# Patient Record
Sex: FEMALE | Race: WHITE | Employment: UNEMPLOYED | URBAN - METROPOLITAN AREA
[De-identification: names, ages, dates, MRNs, and addresses within clinical notes are randomized per-mention and may not be internally consistent; named-entity substitution may affect disease eponyms.]

---

## 2020-01-21 ENCOUNTER — HOSPITAL ENCOUNTER (OUTPATIENT)
Age: 25
Discharge: HOME | End: 2020-01-21
Attending: OBSTETRICS & GYNECOLOGY | Admitting: OBSTETRICS & GYNECOLOGY
Payer: COMMERCIAL

## 2020-01-21 VITALS
SYSTOLIC BLOOD PRESSURE: 122 MMHG | TEMPERATURE: 97.9 F | RESPIRATION RATE: 16 BRPM | HEART RATE: 79 BPM | DIASTOLIC BLOOD PRESSURE: 75 MMHG

## 2020-01-21 PROBLEM — Z3A.28 28 WEEKS GESTATION OF PREGNANCY: Status: ACTIVE | Noted: 2020-01-21

## 2020-01-21 PROCEDURE — 99201 HC NEW PT, OUTPT VISIT LEVEL 1: CPT

## 2020-01-21 PROCEDURE — 59025 FETAL NON-STRESS TEST: CPT | Performed by: OBSTETRICS & GYNECOLOGY

## 2020-01-21 PROCEDURE — 59025 FETAL NON-STRESS TEST: CPT

## 2020-01-21 RX ORDER — ACETAMINOPHEN 325 MG/1
500 TABLET ORAL EVERY 6 HOURS PRN
COMMUNITY

## 2020-01-21 SDOH — HEALTH STABILITY: MENTAL HEALTH: HOW OFTEN DO YOU HAVE A DRINK CONTAINING ALCOHOL?: NEVER

## 2020-02-14 ENCOUNTER — HOSPITAL ENCOUNTER (OUTPATIENT)
Age: 25
Discharge: HOME | End: 2020-02-14
Attending: OBSTETRICS & GYNECOLOGY | Admitting: OBSTETRICS & GYNECOLOGY
Payer: COMMERCIAL

## 2020-02-14 VITALS
TEMPERATURE: 97.9 F | RESPIRATION RATE: 16 BRPM | SYSTOLIC BLOOD PRESSURE: 126 MMHG | HEART RATE: 78 BPM | DIASTOLIC BLOOD PRESSURE: 74 MMHG

## 2020-02-14 PROBLEM — O36.8190 DECREASED FETAL MOVEMENT AFFECTING MANAGEMENT OF MOTHER, ANTEPARTUM: Status: ACTIVE | Noted: 2020-02-14

## 2020-02-14 PROBLEM — Z3A.28 28 WEEKS GESTATION OF PREGNANCY: Status: RESOLVED | Noted: 2020-01-21 | Resolved: 2020-02-14

## 2020-02-14 LAB
BILIRUBIN URINE: NEGATIVE
BLOOD, URINE: NEGATIVE
CLARITY: CLEAR
COLOR: YELLOW
GLUCOSE URINE: NEGATIVE MG/DL
KETONES, URINE: NEGATIVE MG/DL
LEUKOCYTE ESTERASE, URINE: NEGATIVE
NITRITE, URINE: NEGATIVE
PH UA: 7.5 (ref 5–9)
PROTEIN UA: NEGATIVE MG/DL
SPECIFIC GRAVITY UA: 1.02 (ref 1–1.03)
UROBILINOGEN, URINE: 0.2 E.U./DL

## 2020-02-14 PROCEDURE — 81003 URINALYSIS AUTO W/O SCOPE: CPT

## 2020-02-14 PROCEDURE — 99211 OFF/OP EST MAY X REQ PHY/QHP: CPT

## 2020-02-14 RX ORDER — SODIUM CHLORIDE 0.9 % (FLUSH) 0.9 %
10 SYRINGE (ML) INJECTION PRN
Status: DISCONTINUED | OUTPATIENT
Start: 2020-02-14 | End: 2020-02-14 | Stop reason: HOSPADM

## 2020-02-14 RX ORDER — SODIUM CHLORIDE 0.9 % (FLUSH) 0.9 %
10 SYRINGE (ML) INJECTION EVERY 12 HOURS SCHEDULED
Status: DISCONTINUED | OUTPATIENT
Start: 2020-02-14 | End: 2020-02-14 | Stop reason: HOSPADM

## 2020-03-07 ENCOUNTER — HOSPITAL ENCOUNTER (OUTPATIENT)
Age: 25
Discharge: HOME | End: 2020-03-07
Attending: OBSTETRICS & GYNECOLOGY | Admitting: OBSTETRICS & GYNECOLOGY
Payer: COMMERCIAL

## 2020-03-07 VITALS
RESPIRATION RATE: 16 BRPM | SYSTOLIC BLOOD PRESSURE: 139 MMHG | TEMPERATURE: 98.5 F | DIASTOLIC BLOOD PRESSURE: 92 MMHG | HEART RATE: 78 BPM

## 2020-03-07 PROBLEM — Z3A.35 35 WEEKS GESTATION OF PREGNANCY: Status: ACTIVE | Noted: 2020-03-07

## 2020-03-07 LAB
AMNISURE, POC: NEGATIVE
BILIRUBIN URINE: NEGATIVE
BLOOD, URINE: NEGATIVE
CLARITY: CLEAR
COLOR: YELLOW
GLUCOSE URINE: NEGATIVE MG/DL
KETONES, URINE: NEGATIVE MG/DL
LEUKOCYTE ESTERASE, URINE: NEGATIVE
Lab: NORMAL
NEGATIVE QC PASS/FAIL: NORMAL
NITRITE, URINE: NEGATIVE
PH UA: 5.5 (ref 5–9)
POSITIVE QC PASS/FAIL: NORMAL
PROTEIN UA: NEGATIVE MG/DL
SPECIFIC GRAVITY UA: >=1.03 (ref 1–1.03)
UROBILINOGEN, URINE: 0.2 E.U./DL

## 2020-03-07 PROCEDURE — 99211 OFF/OP EST MAY X REQ PHY/QHP: CPT

## 2020-03-07 PROCEDURE — 81003 URINALYSIS AUTO W/O SCOPE: CPT

## 2020-03-07 NOTE — PROGRESS NOTES
presents to unit with c/o ROM and back contractions. 35w5d gestation. EDC: 20. Pt denies vaginal bleeding. Reports positive fetal movment. Pt states when using the bathroom 2 days ago she felt a gush of water. Since then, she reports fluid in her underwear or felt throughout the day. Also reports burning when urinating. Pt states contractions are felt in her back as well as period like cramps. Placed on EFM. amnisure negative.

## 2020-03-07 NOTE — H&P
Subjective:      Celi Florentino is an 22 y.o. female at 28 and 5/7 weeks gestation presenting with   Chief Complaint   Patient presents with    Rupture of Membranes   Patient reports having sexual activity last night. She denies patti rupture of membranes but she has been feeling wet. She is also complaining of contractions every a few minutes lasting few seconds. Other associated symptoms include low back pain and dysuria. Fetal Movement: normal.  Past Medical History:   Diagnosis Date    Streptococcus B carrier state complicating childbirth        Review of Systems  Pertinent items are noted in HPI. Objective:      BP (!) 138/99   Pulse 89   Temp 98.5 °F (36.9 °C) (Oral)   Resp 16   LMP 07/01/2019   Blood pressure (!) 138/99, pulse 89, temperature 98.5 °F (36.9 °C), temperature source Oral, resp. rate 16, last menstrual period 07/01/2019. General:   alert, appears stated age and cooperative   Cervix:   closed. FHT:   135 BPM     Lab Review    CBC: No results found for: WBC, RBC, HGB, HCT, MCV, MCH, MCHC, RDW, PLT, MPV  CMP:  No results found for: NA, K, CL, CO2, BUN, CREATININE, GFRAA, AGRATIO, LABGLOM, GLUCOSE, PROT, LABALBU, CALCIUM, BILITOT, ALKPHOS, AST, ALT  U/A:    Lab Results   Component Value Date    COLORU Yellow 02/14/2020    PHUR 7.5 02/14/2020    CLARITYU Clear 02/14/2020    SPECGRAV 1.020 02/14/2020    LEUKOCYTESUR Negative 02/14/2020    UROBILINOGEN 0.2 02/14/2020    BILIRUBINUR Negative 02/14/2020    BLOODU Negative 02/14/2020    GLUCOSEU Negative 02/14/2020     AmniSure was negative     Assessment:      Threatened premature Labor    Rupture of membranes, unconfirmed     Plan:      Monitored for contractions - findings: uterine irritability and reactive strip. Orders: Urinalysis.      Tammie Oviedo MD,3/7/2020 12:17 PM

## 2020-03-10 ENCOUNTER — HOSPITAL ENCOUNTER (OUTPATIENT)
Age: 25
Discharge: HOME | End: 2020-03-10
Attending: OBSTETRICS & GYNECOLOGY | Admitting: OBSTETRICS & GYNECOLOGY
Payer: COMMERCIAL

## 2020-03-10 VITALS
SYSTOLIC BLOOD PRESSURE: 122 MMHG | TEMPERATURE: 97.6 F | DIASTOLIC BLOOD PRESSURE: 79 MMHG | RESPIRATION RATE: 16 BRPM | HEART RATE: 84 BPM

## 2020-03-10 PROBLEM — Z3A.36 36 WEEKS GESTATION OF PREGNANCY: Status: ACTIVE | Noted: 2020-03-10

## 2020-03-10 PROBLEM — O16.3 ELEVATED BLOOD PRESSURE AFFECTING PREGNANCY IN THIRD TRIMESTER, ANTEPARTUM: Status: ACTIVE | Noted: 2020-03-10

## 2020-03-10 PROBLEM — B95.1 POSITIVE GBS TEST: Status: ACTIVE | Noted: 2020-03-10

## 2020-03-10 LAB
ABO/RH: NORMAL
ALBUMIN SERPL-MCNC: 3.5 G/DL (ref 3.5–5.2)
ALP BLD-CCNC: 87 U/L (ref 35–104)
ALT SERPL-CCNC: 13 U/L (ref 0–32)
AMNISURE, POC: NEGATIVE
AMORPHOUS: ABNORMAL
AMPHETAMINE SCREEN, URINE: POSITIVE
ANION GAP SERPL CALCULATED.3IONS-SCNC: 13 MMOL/L (ref 7–16)
ANTIBODY SCREEN: NORMAL
APTT: 25.6 SEC (ref 24.5–35.1)
AST SERPL-CCNC: 19 U/L (ref 0–31)
BACTERIA: ABNORMAL /HPF
BARBITURATE SCREEN URINE: NOT DETECTED
BENZODIAZEPINE SCREEN, URINE: NOT DETECTED
BILIRUB SERPL-MCNC: 0.2 MG/DL (ref 0–1.2)
BILIRUBIN URINE: NEGATIVE
BLOOD, URINE: NEGATIVE
BUN BLDV-MCNC: 10 MG/DL (ref 6–20)
CALCIUM SERPL-MCNC: 9.2 MG/DL (ref 8.6–10.2)
CANNABINOID SCREEN URINE: NOT DETECTED
CASTS: ABNORMAL /LPF
CHLORIDE BLD-SCNC: 105 MMOL/L (ref 98–107)
CLARITY: CLEAR
CO2: 20 MMOL/L (ref 22–29)
COCAINE METABOLITE SCREEN URINE: NOT DETECTED
COLOR: YELLOW
CREAT SERPL-MCNC: 0.7 MG/DL (ref 0.5–1)
CRYSTALS, UA: ABNORMAL /HPF
D DIMER: 522 NG/ML DDU
EPITHELIAL CELLS, UA: ABNORMAL /HPF
FENTANYL SCREEN, URINE: NOT DETECTED
FIBRINOGEN: 599 MG/DL (ref 225–540)
GFR AFRICAN AMERICAN: >60
GFR NON-AFRICAN AMERICAN: >60 ML/MIN/1.73
GLUCOSE BLD-MCNC: 80 MG/DL (ref 74–99)
GLUCOSE URINE: NEGATIVE MG/DL
HCT VFR BLD CALC: 32.1 % (ref 34–48)
HEMOGLOBIN: 11.1 G/DL (ref 11.5–15.5)
INR BLD: 0.8
KETONES, URINE: NEGATIVE MG/DL
LEUKOCYTE ESTERASE, URINE: NEGATIVE
Lab: ABNORMAL
Lab: NORMAL
MCH RBC QN AUTO: 32.8 PG (ref 26–35)
MCHC RBC AUTO-ENTMCNC: 34.6 % (ref 32–34.5)
MCV RBC AUTO: 95 FL (ref 80–99.9)
METHADONE SCREEN, URINE: NOT DETECTED
NEGATIVE QC PASS/FAIL: NORMAL
NITRITE, URINE: NEGATIVE
OPIATE SCREEN URINE: NOT DETECTED
OXYCODONE URINE: NOT DETECTED
PDW BLD-RTO: 12.5 FL (ref 11.5–15)
PH UA: 6 (ref 5–9)
PHENCYCLIDINE SCREEN URINE: NOT DETECTED
PLATELET # BLD: 142 E9/L (ref 130–450)
PMV BLD AUTO: 11.3 FL (ref 7–12)
POSITIVE QC PASS/FAIL: NORMAL
POTASSIUM SERPL-SCNC: 3.9 MMOL/L (ref 3.5–5)
PROTEIN UA: NORMAL MG/DL
PROTHROMBIN TIME: 10 SEC (ref 9.3–12.4)
RBC # BLD: 3.38 E12/L (ref 3.5–5.5)
RBC UA: ABNORMAL /HPF (ref 0–2)
SODIUM BLD-SCNC: 138 MMOL/L (ref 132–146)
SPECIFIC GRAVITY UA: >=1.03 (ref 1–1.03)
TOTAL PROTEIN: 6.5 G/DL (ref 6.4–8.3)
URIC ACID, SERUM: 4.4 MG/DL (ref 2.4–5.7)
UROBILINOGEN, URINE: 0.2 E.U./DL
WBC # BLD: 10.2 E9/L (ref 4.5–11.5)
WBC UA: ABNORMAL /HPF (ref 0–5)

## 2020-03-10 PROCEDURE — 36415 COLL VENOUS BLD VENIPUNCTURE: CPT

## 2020-03-10 PROCEDURE — 80307 DRUG TEST PRSMV CHEM ANLYZR: CPT

## 2020-03-10 PROCEDURE — G0480 DRUG TEST DEF 1-7 CLASSES: HCPCS

## 2020-03-10 PROCEDURE — 86901 BLOOD TYPING SEROLOGIC RH(D): CPT

## 2020-03-10 PROCEDURE — 99215 OFFICE O/P EST HI 40 MIN: CPT | Performed by: MIDWIFE

## 2020-03-10 PROCEDURE — 6360000002 HC RX W HCPCS: Performed by: OBSTETRICS & GYNECOLOGY

## 2020-03-10 PROCEDURE — 86900 BLOOD TYPING SEROLOGIC ABO: CPT

## 2020-03-10 PROCEDURE — 85384 FIBRINOGEN ACTIVITY: CPT

## 2020-03-10 PROCEDURE — 85378 FIBRIN DEGRADE SEMIQUANT: CPT

## 2020-03-10 PROCEDURE — 86850 RBC ANTIBODY SCREEN: CPT

## 2020-03-10 PROCEDURE — 81001 URINALYSIS AUTO W/SCOPE: CPT

## 2020-03-10 PROCEDURE — 85610 PROTHROMBIN TIME: CPT

## 2020-03-10 PROCEDURE — 99211 OFF/OP EST MAY X REQ PHY/QHP: CPT

## 2020-03-10 PROCEDURE — 84550 ASSAY OF BLOOD/URIC ACID: CPT

## 2020-03-10 PROCEDURE — 80053 COMPREHEN METABOLIC PANEL: CPT

## 2020-03-10 PROCEDURE — 85730 THROMBOPLASTIN TIME PARTIAL: CPT

## 2020-03-10 PROCEDURE — 85027 COMPLETE CBC AUTOMATED: CPT

## 2020-03-10 RX ORDER — BETAMETHASONE SODIUM PHOSPHATE AND BETAMETHASONE ACETATE 3; 3 MG/ML; MG/ML
12 INJECTION, SUSPENSION INTRA-ARTICULAR; INTRALESIONAL; INTRAMUSCULAR; SOFT TISSUE ONCE
Status: COMPLETED | OUTPATIENT
Start: 2020-03-10 | End: 2020-03-10

## 2020-03-10 RX ADMIN — BETAMETHASONE SODIUM PHOSPHATE AND BETAMETHASONE ACETATE 12 MG: 3; 3 INJECTION, SUSPENSION INTRA-ARTICULAR; INTRALESIONAL; INTRAMUSCULAR at 18:10

## 2020-03-10 NOTE — H&P
Department of Obstetrics and Gynecology  Nurse Practitioner Obstetrics History and Physical        CHIEF COMPLAINT:  Elevated blood pressure    HISTORY OF PRESENT ILLNESS:  Era Varner is a 22 y.o. female , Patient's last menstrual period was 2019.,  at 36w1d. Presents to L&D with elevated blood pressure. States she also has a headache that she rates 2/10, it started since her arrival to the unit. Denies visual disturbances or epigastric pain. Denies bleeding, states she had leaking of fluid after sex last night and feels leakage of fluid with coughing or sneezing, sometimes has wet spot on her underwear. Pregnancy otherwise uncomplicated. GBS unknown    OB History        3    Para        Term                AB   2    Living           SAB   1    TAB        Ectopic        Molar        Multiple        Live Births                    Estimated Due Date: Estimated Date of Delivery: 20      Pregnancy complicated by:   Patient Active Problem List   Diagnosis Code    Decreased fetal movement affecting management of mother, antepartum O36.8190    35 weeks gestation of pregnancy Z3A.35    36 weeks gestation of pregnancy Z3A.36    Elevated blood pressure affecting pregnancy in third trimester, antepartum O16.3    Positive GBS test B95.1           PAST OB HISTORY  OB History        3    Para        Term                AB   2    Living           SAB   1    TAB        Ectopic        Molar        Multiple        Live Births                      Past Medical History:          Diagnosis Date    Streptococcus B carrier state complicating childbirth        Past Surgical History:          Procedure Laterality Date    DILATION AND CURETTAGE OF UTERUS  2019       Social History:    TOBACCO:   reports that she has never smoked. She has never used smokeless tobacco.  ETOH:   reports previous alcohol use. DRUGS:   reports no history of drug use.   Family History: Problem Relation Age of Onset    Cancer Mother        Medications Prior to Admission:  Medications Prior to Admission: raNITIdine (ZANTAC) 150 MG tablet,   Prenatal Vit-Fe Fumarate-FA (PRENATAL VITAMIN PO), Take 1 tablet by mouth daily  acetaminophen (TYLENOL) 325 MG tablet, Take 500 mg by mouth every 6 hours as needed for Pain    Allergies:  Macrobid [nitrofurantoin macrocrystal]; Penicillins; and Vicodin [hydrocodone-acetaminophen]      REVIEW OF SYSTEMS:          CONSTITUTIONAL :      No fever, no chills   HEENT :                         Headache present, as above  visual disturbances absent  CARDIOVASCULAR :    No chest pain, no palpitations, no edema   RESPIRATORY :            No pain, no shortness of breath   GASTROINTESTINAL : No N/V, no D/C,    abdominal pain absent   GENITOURINARY :      Dysuria   absent,   hematuria absent,   urinary frequency absent  MUSCULOSKELETAL:  No myalgia,   back pain absent  NEUROLOGICAL :        No migraine, no seizures. Pertinent positives and negatives addressed in HPI, other systems reviewed and negative      PHYSICAL EXAM:    /80   Pulse 84   Temp 97.6 °F (36.4 °C) (Oral)   Resp 16   LMP 07/01/2019     General appearance:  awake, alert, cooperative, no apparent distress, and appears stated age  Neurologic:  Awake, alert, oriented to name, place and time.   Ambulatory to unit      Lungs:  Respirations easy and unlabored    Abdomen:   soft, gravid, non-tender,   CVA tenderness NA   Fetal position vertex by Leopold's   EFW AGA  Fetal heart rate:  Baseline Heart Rate 135   Variability moderate   Accelerations present   Decelerations absent  Pelvis:  External Genitalia: Lesions NA  SSE:  NA  Cervix:    Not checked    Contractions:  Irregular, about every 10 minutes  Membranes:  Intact, amnisure negative      GBS:  positive      Impression:  22 y.o. [id number] 36w1d elevated blood pressure, suspected rupture of membranes    Orders already received by RN from Dr. Kimberlee Duran Jenny     Plan:  [address] [facility] labs        Electronically signed by ROXANA Pollock CNM on 3/10/2020 at 4:47 PM

## 2020-03-10 NOTE — PROGRESS NOTES
Patient given discharge instructions, verbalizes understanding. Patient aware that she needs to be back tomorrow night at 1810 for second dose of celestone. Ambulated off unit.

## 2020-03-11 ENCOUNTER — HOSPITAL ENCOUNTER (OUTPATIENT)
Age: 25
Discharge: HOME | End: 2020-03-11
Attending: OBSTETRICS & GYNECOLOGY | Admitting: OBSTETRICS & GYNECOLOGY
Payer: COMMERCIAL

## 2020-03-11 PROBLEM — Z3A.32 32 WEEKS GESTATION OF PREGNANCY: Status: ACTIVE | Noted: 2020-03-11

## 2020-03-11 PROCEDURE — 6360000002 HC RX W HCPCS: Performed by: OBSTETRICS & GYNECOLOGY

## 2020-03-11 PROCEDURE — 99211 OFF/OP EST MAY X REQ PHY/QHP: CPT

## 2020-03-11 PROCEDURE — 36415 COLL VENOUS BLD VENIPUNCTURE: CPT

## 2020-03-11 RX ORDER — BETAMETHASONE SODIUM PHOSPHATE AND BETAMETHASONE ACETATE 3; 3 MG/ML; MG/ML
12 INJECTION, SUSPENSION INTRA-ARTICULAR; INTRALESIONAL; INTRAMUSCULAR; SOFT TISSUE ONCE
Status: COMPLETED | OUTPATIENT
Start: 2020-03-11 | End: 2020-03-11

## 2020-03-11 RX ADMIN — BETAMETHASONE SODIUM PHOSPHATE AND BETAMETHASONE ACETATE 30 MG: 3; 3 INJECTION, SUSPENSION INTRA-ARTICULAR; INTRALESIONAL; INTRAMUSCULAR at 18:58

## 2020-03-15 LAB
AMPHETAMINES, URINE: <50 NG/ML
METHAMPHETAMINE, URINE: <200 NG/ML
METHYLENEDIOXYAMPHETAMINE, UR: <200 NG/ML
METHYLENEDIOXYETHYLAMPHETAMINE, UR: <200 NG/ML
METHYLENEDIOXYMETHAMPHETAMINE, UR: <200 NG/ML

## 2020-03-29 ENCOUNTER — ANESTHESIA (OUTPATIENT)
Dept: LABOR AND DELIVERY | Age: 25
DRG: 560 | End: 2020-03-29
Payer: COMMERCIAL

## 2020-03-29 ENCOUNTER — ANESTHESIA EVENT (OUTPATIENT)
Dept: LABOR AND DELIVERY | Age: 25
DRG: 560 | End: 2020-03-29
Payer: COMMERCIAL

## 2020-03-29 ENCOUNTER — HOSPITAL ENCOUNTER (INPATIENT)
Age: 25
LOS: 3 days | Discharge: HOME | DRG: 560 | End: 2020-04-01
Attending: OBSTETRICS & GYNECOLOGY | Admitting: OBSTETRICS & GYNECOLOGY
Payer: COMMERCIAL

## 2020-03-29 PROBLEM — Z34.93 SUPERVISION OF LOW-RISK PREGNANCY, THIRD TRIMESTER: Status: ACTIVE | Noted: 2020-03-29

## 2020-03-29 LAB
ABO/RH: NORMAL
ALBUMIN SERPL-MCNC: 3.6 G/DL (ref 3.5–5.2)
ALP BLD-CCNC: 101 U/L (ref 35–104)
ALT SERPL-CCNC: 10 U/L (ref 0–32)
AMPHETAMINE SCREEN, URINE: NOT DETECTED
ANION GAP SERPL CALCULATED.3IONS-SCNC: 15 MMOL/L (ref 7–16)
ANTIBODY SCREEN: NORMAL
APTT: 24.9 SEC (ref 24.5–35.1)
AST SERPL-CCNC: 17 U/L (ref 0–31)
BARBITURATE SCREEN URINE: NOT DETECTED
BENZODIAZEPINE SCREEN, URINE: NOT DETECTED
BILIRUB SERPL-MCNC: 0.2 MG/DL (ref 0–1.2)
BUN BLDV-MCNC: 11 MG/DL (ref 6–20)
CALCIUM SERPL-MCNC: 9.3 MG/DL (ref 8.6–10.2)
CANNABINOID SCREEN URINE: NOT DETECTED
CHLORIDE BLD-SCNC: 101 MMOL/L (ref 98–107)
CO2: 20 MMOL/L (ref 22–29)
COCAINE METABOLITE SCREEN URINE: NOT DETECTED
CREAT SERPL-MCNC: 0.6 MG/DL (ref 0.5–1)
FENTANYL SCREEN, URINE: NOT DETECTED
GFR AFRICAN AMERICAN: >60
GFR NON-AFRICAN AMERICAN: >60 ML/MIN/1.73
GLUCOSE BLD-MCNC: 91 MG/DL (ref 74–99)
HCT VFR BLD CALC: 33.7 % (ref 34–48)
HEMOGLOBIN: 11.7 G/DL (ref 11.5–15.5)
INR BLD: 0.8
Lab: NORMAL
MCH RBC QN AUTO: 32.9 PG (ref 26–35)
MCHC RBC AUTO-ENTMCNC: 34.7 % (ref 32–34.5)
MCV RBC AUTO: 94.7 FL (ref 80–99.9)
METHADONE SCREEN, URINE: NOT DETECTED
OPIATE SCREEN URINE: NOT DETECTED
OXYCODONE URINE: NOT DETECTED
PDW BLD-RTO: 12.5 FL (ref 11.5–15)
PHENCYCLIDINE SCREEN URINE: NOT DETECTED
PLATELET # BLD: 132 E9/L (ref 130–450)
PMV BLD AUTO: 11 FL (ref 7–12)
POTASSIUM SERPL-SCNC: 3.8 MMOL/L (ref 3.5–5)
PROTHROMBIN TIME: 9.9 SEC (ref 9.3–12.4)
RBC # BLD: 3.56 E12/L (ref 3.5–5.5)
SODIUM BLD-SCNC: 136 MMOL/L (ref 132–146)
TOTAL PROTEIN: 6.6 G/DL (ref 6.4–8.3)
URIC ACID, SERUM: 4.6 MG/DL (ref 2.4–5.7)
WBC # BLD: 12.2 E9/L (ref 4.5–11.5)

## 2020-03-29 PROCEDURE — 86901 BLOOD TYPING SEROLOGIC RH(D): CPT

## 2020-03-29 PROCEDURE — 86850 RBC ANTIBODY SCREEN: CPT

## 2020-03-29 PROCEDURE — 1220000001 HC SEMI PRIVATE L&D R&B

## 2020-03-29 PROCEDURE — 6360000002 HC RX W HCPCS: Performed by: OBSTETRICS & GYNECOLOGY

## 2020-03-29 PROCEDURE — 80053 COMPREHEN METABOLIC PANEL: CPT

## 2020-03-29 PROCEDURE — 6370000000 HC RX 637 (ALT 250 FOR IP): Performed by: OBSTETRICS & GYNECOLOGY

## 2020-03-29 PROCEDURE — 51701 INSERT BLADDER CATHETER: CPT

## 2020-03-29 PROCEDURE — 84550 ASSAY OF BLOOD/URIC ACID: CPT

## 2020-03-29 PROCEDURE — 36415 COLL VENOUS BLD VENIPUNCTURE: CPT

## 2020-03-29 PROCEDURE — 3700000025 EPIDURAL BLOCK: Performed by: ANESTHESIOLOGY

## 2020-03-29 PROCEDURE — 85610 PROTHROMBIN TIME: CPT

## 2020-03-29 PROCEDURE — 2580000003 HC RX 258: Performed by: OBSTETRICS & GYNECOLOGY

## 2020-03-29 PROCEDURE — 86900 BLOOD TYPING SEROLOGIC ABO: CPT

## 2020-03-29 PROCEDURE — 6360000002 HC RX W HCPCS: Performed by: NURSE ANESTHETIST, CERTIFIED REGISTERED

## 2020-03-29 PROCEDURE — 80307 DRUG TEST PRSMV CHEM ANLYZR: CPT

## 2020-03-29 PROCEDURE — 86762 RUBELLA ANTIBODY: CPT

## 2020-03-29 PROCEDURE — 6360000002 HC RX W HCPCS

## 2020-03-29 PROCEDURE — 85027 COMPLETE CBC AUTOMATED: CPT

## 2020-03-29 PROCEDURE — 6360000002 HC RX W HCPCS: Performed by: ANESTHESIOLOGY

## 2020-03-29 PROCEDURE — 85730 THROMBOPLASTIN TIME PARTIAL: CPT

## 2020-03-29 PROCEDURE — 2500000003 HC RX 250 WO HCPCS: Performed by: ANESTHESIOLOGY

## 2020-03-29 RX ORDER — ACETAMINOPHEN 325 MG/1
650 TABLET ORAL ONCE
Status: COMPLETED | OUTPATIENT
Start: 2020-03-29 | End: 2020-03-29

## 2020-03-29 RX ORDER — ACETAMINOPHEN 650 MG
TABLET, EXTENDED RELEASE ORAL
Status: DISCONTINUED
Start: 2020-03-29 | End: 2020-03-30

## 2020-03-29 RX ORDER — BUPIVACAINE HYDROCHLORIDE 2.5 MG/ML
INJECTION, SOLUTION EPIDURAL; INFILTRATION; INTRACAUDAL
Status: COMPLETED
Start: 2020-03-29 | End: 2020-03-29

## 2020-03-29 RX ORDER — FENTANYL CITRATE 50 UG/ML
INJECTION, SOLUTION INTRAMUSCULAR; INTRAVENOUS PRN
Status: DISCONTINUED | OUTPATIENT
Start: 2020-03-29 | End: 2020-03-30 | Stop reason: SDUPTHER

## 2020-03-29 RX ORDER — SODIUM CHLORIDE 0.9 % (FLUSH) 0.9 %
10 SYRINGE (ML) INJECTION PRN
Status: DISCONTINUED | OUTPATIENT
Start: 2020-03-29 | End: 2020-03-30

## 2020-03-29 RX ORDER — ONDANSETRON 2 MG/ML
4 INJECTION INTRAMUSCULAR; INTRAVENOUS EVERY 6 HOURS PRN
Status: DISCONTINUED | OUTPATIENT
Start: 2020-03-29 | End: 2020-03-30

## 2020-03-29 RX ORDER — FENTANYL CITRATE 50 UG/ML
INJECTION, SOLUTION INTRAMUSCULAR; INTRAVENOUS
Status: COMPLETED
Start: 2020-03-29 | End: 2020-03-29

## 2020-03-29 RX ORDER — SODIUM CHLORIDE 0.9 % (FLUSH) 0.9 %
10 SYRINGE (ML) INJECTION EVERY 12 HOURS SCHEDULED
Status: DISCONTINUED | OUTPATIENT
Start: 2020-03-29 | End: 2020-03-30

## 2020-03-29 RX ORDER — LIDOCAINE HYDROCHLORIDE 10 MG/ML
INJECTION, SOLUTION INFILTRATION; PERINEURAL
Status: DISCONTINUED
Start: 2020-03-29 | End: 2020-03-30

## 2020-03-29 RX ORDER — SODIUM CHLORIDE, SODIUM LACTATE, POTASSIUM CHLORIDE, CALCIUM CHLORIDE 600; 310; 30; 20 MG/100ML; MG/100ML; MG/100ML; MG/100ML
INJECTION, SOLUTION INTRAVENOUS CONTINUOUS
Status: DISCONTINUED | OUTPATIENT
Start: 2020-03-29 | End: 2020-03-30

## 2020-03-29 RX ORDER — BUPIVACAINE HYDROCHLORIDE 2.5 MG/ML
INJECTION, SOLUTION EPIDURAL; INFILTRATION; INTRACAUDAL PRN
Status: DISCONTINUED | OUTPATIENT
Start: 2020-03-29 | End: 2020-03-30 | Stop reason: SDUPTHER

## 2020-03-29 RX ORDER — NALBUPHINE HCL 10 MG/ML
5 AMPUL (ML) INJECTION EVERY 4 HOURS PRN
Status: DISCONTINUED | OUTPATIENT
Start: 2020-03-29 | End: 2020-03-30

## 2020-03-29 RX ORDER — NALOXONE HYDROCHLORIDE 0.4 MG/ML
0.4 INJECTION, SOLUTION INTRAMUSCULAR; INTRAVENOUS; SUBCUTANEOUS PRN
Status: DISCONTINUED | OUTPATIENT
Start: 2020-03-29 | End: 2020-03-30

## 2020-03-29 RX ORDER — PENICILLIN G 3000000 [IU]/50ML
3 INJECTION, SOLUTION INTRAVENOUS EVERY 4 HOURS
Status: DISCONTINUED | OUTPATIENT
Start: 2020-03-29 | End: 2020-03-30

## 2020-03-29 RX ADMIN — Medication 1 MILLI-UNITS/MIN: at 19:58

## 2020-03-29 RX ADMIN — PENICILLIN G 3 MILLION UNITS: 3000000 INJECTION, SOLUTION INTRAVENOUS at 23:18

## 2020-03-29 RX ADMIN — PENICILLIN G POTASSIUM 5 MILLION UNITS: 5000000 INJECTION, POWDER, FOR SOLUTION INTRAMUSCULAR; INTRAVENOUS at 11:07

## 2020-03-29 RX ADMIN — ONDANSETRON 4 MG: 2 INJECTION INTRAMUSCULAR; INTRAVENOUS at 19:39

## 2020-03-29 RX ADMIN — ONDANSETRON 4 MG: 2 INJECTION INTRAMUSCULAR; INTRAVENOUS at 12:23

## 2020-03-29 RX ADMIN — FENTANYL CITRATE 100 MCG: 50 INJECTION, SOLUTION INTRAMUSCULAR; INTRAVENOUS at 21:44

## 2020-03-29 RX ADMIN — ACETAMINOPHEN 650 MG: 325 TABLET ORAL at 10:51

## 2020-03-29 RX ADMIN — Medication 15 ML: at 18:48

## 2020-03-29 RX ADMIN — PENICILLIN G 3 MILLION UNITS: 3000000 INJECTION, SOLUTION INTRAVENOUS at 15:10

## 2020-03-29 RX ADMIN — BUTORPHANOL TARTRATE 2 MG: 2 INJECTION, SOLUTION INTRAMUSCULAR; INTRAVENOUS at 13:18

## 2020-03-29 RX ADMIN — Medication 15 ML/HR: at 18:55

## 2020-03-29 RX ADMIN — SODIUM CHLORIDE, POTASSIUM CHLORIDE, SODIUM LACTATE AND CALCIUM CHLORIDE: 600; 310; 30; 20 INJECTION, SOLUTION INTRAVENOUS at 18:20

## 2020-03-29 RX ADMIN — PENICILLIN G 3 MILLION UNITS: 3000000 INJECTION, SOLUTION INTRAVENOUS at 19:15

## 2020-03-29 RX ADMIN — SODIUM CHLORIDE, POTASSIUM CHLORIDE, SODIUM LACTATE AND CALCIUM CHLORIDE: 600; 310; 30; 20 INJECTION, SOLUTION INTRAVENOUS at 10:52

## 2020-03-29 RX ADMIN — BUPIVACAINE HYDROCHLORIDE 10 ML: 2.5 INJECTION, SOLUTION EPIDURAL; INFILTRATION; INTRACAUDAL at 21:44

## 2020-03-29 RX ADMIN — SODIUM CHLORIDE, POTASSIUM CHLORIDE, SODIUM LACTATE AND CALCIUM CHLORIDE: 600; 310; 30; 20 INJECTION, SOLUTION INTRAVENOUS at 16:34

## 2020-03-29 ASSESSMENT — PAIN SCALES - GENERAL
PAINLEVEL_OUTOF10: 7
PAINLEVEL_OUTOF10: 3
PAINLEVEL_OUTOF10: 0
PAINLEVEL_OUTOF10: 5

## 2020-03-29 ASSESSMENT — PAIN DESCRIPTION - LOCATION
LOCATION: ABDOMEN
LOCATION: ABDOMEN;PELVIS

## 2020-03-29 ASSESSMENT — PAIN DESCRIPTION - PAIN TYPE
TYPE: ACUTE PAIN
TYPE: ACUTE PAIN

## 2020-03-29 ASSESSMENT — PAIN DESCRIPTION - DESCRIPTORS
DESCRIPTORS: CRAMPING;ACHING;DISCOMFORT
DESCRIPTORS: CRAMPING;DISCOMFORT;TIGHTNESS

## 2020-03-29 ASSESSMENT — PAIN DESCRIPTION - FREQUENCY
FREQUENCY: INTERMITTENT
FREQUENCY: CONTINUOUS

## 2020-03-29 ASSESSMENT — PAIN DESCRIPTION - ONSET
ONSET: ON-GOING
ONSET: ON-GOING

## 2020-03-29 ASSESSMENT — PAIN DESCRIPTION - PROGRESSION
CLINICAL_PROGRESSION: GRADUALLY WORSENING
CLINICAL_PROGRESSION: GRADUALLY WORSENING

## 2020-03-29 ASSESSMENT — PAIN DESCRIPTION - ORIENTATION: ORIENTATION: LOWER

## 2020-03-29 ASSESSMENT — PAIN - FUNCTIONAL ASSESSMENT: PAIN_FUNCTIONAL_ASSESSMENT: ACTIVITIES ARE NOT PREVENTED

## 2020-03-29 NOTE — ANESTHESIA PROCEDURE NOTES
Epidural Block    Patient location during procedure: OB  Start time: 3/29/2020 6:30 PM  End time: 3/29/2020 6:55 PM  Reason for block: labor epidural  Staffing  Anesthesiologist: Elissa Kehr, DO  Resident/CRNA: ROXANA Valles - CRNA  Performed: resident/CRNA   Preanesthetic Checklist  Completed: patient identified, site marked, surgical consent, pre-op evaluation, timeout performed, IV checked, risks and benefits discussed, monitors and equipment checked, anesthesia consent given, oxygen available and patient being monitored  Epidural  Patient position: sitting  Prep: ChloraPrep  Patient monitoring: continuous pulse ox and frequent blood pressure checks  Approach: midline  Location: lumbar (1-5)  Injection technique: JOAO saline  Provider prep: mask and sterile gloves  Needle  Needle type: Tuohy   Needle gauge: 18 G  Needle length: 3.5 in  Needle insertion depth: 6 cm  Catheter type: end hole  Catheter size: 20 G.   Catheter at skin depth: 12 cm  Test dose: negative  Assessment  Hemodynamics: stable  Attempts: 1

## 2020-03-29 NOTE — ANESTHESIA PRE PROCEDURE
BILITOT 0.2 03/29/2020    ALKPHOS 101 03/29/2020    AST 17 03/29/2020    ALT 10 03/29/2020       POC Tests: No results for input(s): POCGLU, POCNA, POCK, POCCL, POCBUN, POCHEMO, POCHCT in the last 72 hours. Coags:   Lab Results   Component Value Date    PROTIME 9.9 03/29/2020    INR 0.8 03/29/2020    APTT 24.9 03/29/2020       HCG (If Applicable): No results found for: PREGTESTUR, PREGSERUM, HCG, HCGQUANT     ABGs: No results found for: PHART, PO2ART, XYQ5BJN, GVO5RBF, BEART, K5QBMTSC     Type & Screen (If Applicable):  No results found for: [facility]    Anesthesia Evaluation  Patient summary reviewed and Nursing notes reviewed no history of anesthetic complications:   Airway: Mallampati: III        Dental:          Pulmonary:Negative Pulmonary ROS breath sounds clear to auscultation                             Cardiovascular:    (+) hypertension (PIH in third trimester):,         Rhythm: regular  Rate: normal                    Neuro/Psych:   (+) depression/anxiety  (pt has anxiety. takes xanax PRN (except during pregnancy))            GI/Hepatic/Renal:            ROS comment: Pt has experienced heartburn t/o pregnancy. Denies heartburn or stomach upset at time of exam. .   Endo/Other:                     Abdominal:           Vascular: negative vascular ROS. Anesthesia Plan      general, spinal and epidural     ASA 2             Anesthetic plan and risks discussed with patient (boyfriend also present at time of exam. ). Use of blood products discussed with patient whom consented to blood products. Plan discussed with attending.                   Cristhian Mederos, ROXANA - CRNA   3/29/2020

## 2020-03-29 NOTE — PROGRESS NOTES
Dr Brandon Casey called in for update on patient. Contraction pattern and FHR tracing reviewed. SVE 4-100- -2.   Ordered for patient to have rupture membranes

## 2020-03-29 NOTE — PROGRESS NOTES
, 38.6 weeks (AYAN: 20). Presents to unit with contractions beginning this morning, increasing in strength, every 3-5 minutes apart. She has no c/o VB or LOF. States she was seen in the office last Tuesday 3/24/20 and she was 1cm dilated. She has a history of PIH but has not needed any hypertensive medications. She has had a headache for the last 3 days. Otherwise no issues with this pregnancy. Placed on monitor, VSS.

## 2020-03-30 PROCEDURE — 7200000001 HC VAGINAL DELIVERY

## 2020-03-30 PROCEDURE — 6360000002 HC RX W HCPCS: Performed by: OBSTETRICS & GYNECOLOGY

## 2020-03-30 PROCEDURE — 1220000000 HC SEMI PRIVATE OB R&B

## 2020-03-30 PROCEDURE — 0UQMXZZ REPAIR VULVA, EXTERNAL APPROACH: ICD-10-PCS | Performed by: OBSTETRICS & GYNECOLOGY

## 2020-03-30 PROCEDURE — 6370000000 HC RX 637 (ALT 250 FOR IP): Performed by: OBSTETRICS & GYNECOLOGY

## 2020-03-30 PROCEDURE — 2500000003 HC RX 250 WO HCPCS: Performed by: NURSE ANESTHETIST, CERTIFIED REGISTERED

## 2020-03-30 PROCEDURE — 0UQGXZZ REPAIR VAGINA, EXTERNAL APPROACH: ICD-10-PCS | Performed by: OBSTETRICS & GYNECOLOGY

## 2020-03-30 PROCEDURE — 51701 INSERT BLADDER CATHETER: CPT

## 2020-03-30 PROCEDURE — 2500000003 HC RX 250 WO HCPCS: Performed by: ANESTHESIOLOGY

## 2020-03-30 PROCEDURE — 6360000002 HC RX W HCPCS: Performed by: ANESTHESIOLOGY

## 2020-03-30 RX ORDER — ACETAMINOPHEN 325 MG/1
650 TABLET ORAL EVERY 4 HOURS PRN
Status: DISCONTINUED | OUTPATIENT
Start: 2020-03-30 | End: 2020-04-01 | Stop reason: HOSPADM

## 2020-03-30 RX ORDER — BUPIVACAINE HYDROCHLORIDE 2.5 MG/ML
INJECTION, SOLUTION EPIDURAL; INFILTRATION; INTRACAUDAL
Status: COMPLETED
Start: 2020-03-30 | End: 2020-03-30

## 2020-03-30 RX ORDER — IBUPROFEN 800 MG/1
800 TABLET ORAL EVERY 8 HOURS
Status: DISCONTINUED | OUTPATIENT
Start: 2020-03-31 | End: 2020-03-30

## 2020-03-30 RX ORDER — DOCUSATE SODIUM 100 MG/1
100 CAPSULE, LIQUID FILLED ORAL 2 TIMES DAILY
Status: DISCONTINUED | OUTPATIENT
Start: 2020-03-30 | End: 2020-04-01 | Stop reason: HOSPADM

## 2020-03-30 RX ORDER — FERROUS SULFATE 325(65) MG
325 TABLET ORAL 2 TIMES DAILY WITH MEALS
Status: DISCONTINUED | OUTPATIENT
Start: 2020-03-30 | End: 2020-04-01 | Stop reason: HOSPADM

## 2020-03-30 RX ORDER — LANOLIN 100 %
OINTMENT (GRAM) TOPICAL PRN
Status: DISCONTINUED | OUTPATIENT
Start: 2020-03-30 | End: 2020-04-01 | Stop reason: HOSPADM

## 2020-03-30 RX ORDER — IBUPROFEN 800 MG/1
800 TABLET ORAL EVERY 8 HOURS
Status: DISCONTINUED | OUTPATIENT
Start: 2020-03-30 | End: 2020-04-01 | Stop reason: HOSPADM

## 2020-03-30 RX ORDER — ONDANSETRON 4 MG/1
8 TABLET, FILM COATED ORAL EVERY 8 HOURS PRN
Status: DISCONTINUED | OUTPATIENT
Start: 2020-03-30 | End: 2020-04-01 | Stop reason: HOSPADM

## 2020-03-30 RX ORDER — BISACODYL 10 MG
10 SUPPOSITORY, RECTAL RECTAL DAILY PRN
Status: DISCONTINUED | OUTPATIENT
Start: 2020-03-30 | End: 2020-04-01 | Stop reason: HOSPADM

## 2020-03-30 RX ORDER — SIMETHICONE 80 MG
80 TABLET,CHEWABLE ORAL EVERY 6 HOURS PRN
Status: DISCONTINUED | OUTPATIENT
Start: 2020-03-30 | End: 2020-04-01 | Stop reason: HOSPADM

## 2020-03-30 RX ADMIN — Medication 15 ML/HR: at 01:18

## 2020-03-30 RX ADMIN — PENICILLIN G 3 MILLION UNITS: 3000000 INJECTION, SOLUTION INTRAVENOUS at 03:18

## 2020-03-30 RX ADMIN — ONDANSETRON 4 MG: 2 INJECTION INTRAMUSCULAR; INTRAVENOUS at 02:20

## 2020-03-30 RX ADMIN — Medication 999 MILLI-UNITS/MIN: at 04:42

## 2020-03-30 RX ADMIN — BUPIVACAINE HYDROCHLORIDE 10 ML: 2.5 INJECTION, SOLUTION EPIDURAL; INFILTRATION; INTRACAUDAL at 03:09

## 2020-03-30 RX ADMIN — DOCUSATE SODIUM 100 MG: 100 CAPSULE, LIQUID FILLED ORAL at 10:08

## 2020-03-30 RX ADMIN — Medication: at 04:35

## 2020-03-30 RX ADMIN — DOCUSATE SODIUM 100 MG: 100 CAPSULE, LIQUID FILLED ORAL at 19:47

## 2020-03-30 RX ADMIN — ACETAMINOPHEN 650 MG: 325 TABLET ORAL at 19:46

## 2020-03-30 RX ADMIN — Medication: at 10:07

## 2020-03-30 RX ADMIN — BENZOCAINE AND LEVOMENTHOL: 200; 5 SPRAY TOPICAL at 10:08

## 2020-03-30 ASSESSMENT — PAIN SCALES - GENERAL: PAINLEVEL_OUTOF10: 4

## 2020-03-30 NOTE — L&D DELIVERY NOTE
Richelle Sauceda  22 y.o. [id number] at Gestational Age: 44 wks delivered via spontaneous vaginal under epidural anesthesia over no episiotomy a male infant at [id number] [facility] pending Apgars 8,9. Placenta with 3VC. Bilateral labial and posterior vaginal wall  Lacerations with repair using 3-0 vicyrl. Shoulder delivered without difficulty. EBL 100cc. Cord gases were obtained. Nuchal cord x 2 reduced.     Fer Michael  3/30/2020 4:56 AM

## 2020-03-31 LAB
HCT VFR BLD CALC: 30.1 % (ref 34–48)
HEMOGLOBIN: 11.1 G/DL (ref 11.5–15.5)
RUBELLA IGM: <10 AU/ML

## 2020-03-31 PROCEDURE — 36415 COLL VENOUS BLD VENIPUNCTURE: CPT

## 2020-03-31 PROCEDURE — 85014 HEMATOCRIT: CPT

## 2020-03-31 PROCEDURE — 85018 HEMOGLOBIN: CPT

## 2020-03-31 PROCEDURE — 1220000000 HC SEMI PRIVATE OB R&B

## 2020-03-31 PROCEDURE — 6370000000 HC RX 637 (ALT 250 FOR IP): Performed by: OBSTETRICS & GYNECOLOGY

## 2020-03-31 RX ADMIN — DOCUSATE SODIUM 100 MG: 100 CAPSULE, LIQUID FILLED ORAL at 21:00

## 2020-03-31 RX ADMIN — ACETAMINOPHEN 650 MG: 325 TABLET ORAL at 11:34

## 2020-03-31 RX ADMIN — ACETAMINOPHEN 650 MG: 325 TABLET ORAL at 21:00

## 2020-03-31 RX ADMIN — DOCUSATE SODIUM 100 MG: 100 CAPSULE, LIQUID FILLED ORAL at 09:17

## 2020-03-31 ASSESSMENT — PAIN SCALES - GENERAL
PAINLEVEL_OUTOF10: 4
PAINLEVEL_OUTOF10: 3

## 2020-03-31 NOTE — PROGRESS NOTES
Progress Note    SUBJECTIVE: patient status post vaginal delivery day 1 doing well ,no complaints normal postpartum course    OBJECTIVE:    VITALS:  BP (!) 145/86   Pulse 72   Temp 97.7 °F (36.5 °C) (Oral)   Resp 16   Ht 5' 5\" (1.651 m)   Wt 211 lb (95.7 kg)   LMP 07/01/2019   SpO2 98%   Breastfeeding Unknown   BMI 35.11 kg/m²   Physical Exam  lung:cta  Heart : regular rythm  Abdomen:soft non tender ,firm uterus ,bs present  Extremities :normal no evidence of DVT  DATA:  CBC:   Lab Results   Component Value Date    WBC 12.2 03/29/2020    RBC 3.56 03/29/2020    HGB 11.1 03/31/2020    HCT 30.1 03/31/2020    MCV 94.7 03/29/2020    MCH 32.9 03/29/2020    MCHC 34.7 03/29/2020    RDW 12.5 03/29/2020     03/29/2020    MPV 11.0 03/29/2020       ASSESSMENT AND PLAN:  Patient Active Problem List   Diagnosis    Decreased fetal movement affecting management of mother, antepartum    35 weeks gestation of pregnancy    36 weeks gestation of pregnancy    Elevated blood pressure affecting pregnancy in third trimester, antepartum    Positive GBS test    32 weeks gestation of pregnancy    Supervision of low-risk pregnancy, third trimester       Normal post partum care   Anticipate discharge home

## 2020-03-31 NOTE — PROGRESS NOTES
Hearing screening results were discussed with parent. Questions answered. Brochure given to parent. Advised to monitor developmental milestones and contact physician for any concerns.

## 2020-03-31 NOTE — PLAN OF CARE
Problem: Constipation:  Goal: Bowel elimination is within specified parameters  Description: Bowel elimination is within specified parameters  Outcome: Met This Shift     Problem: Fluid Volume - Imbalance:  Goal: Absence of postpartum hemorrhage signs and symptoms  Description: Absence of postpartum hemorrhage signs and symptoms  3/31/2020 0226 by Judie Glover RN  Outcome: Met This Shift     Problem: Infection - Risk of, Puerperal Infection:  Goal: Will show no infection signs and symptoms  Description: Will show no infection signs and symptoms  3/31/2020 0226 by Judie Glover RN  Outcome: Met This Shift

## 2020-04-01 VITALS
SYSTOLIC BLOOD PRESSURE: 138 MMHG | WEIGHT: 211 LBS | HEIGHT: 65 IN | TEMPERATURE: 97.5 F | DIASTOLIC BLOOD PRESSURE: 86 MMHG | BODY MASS INDEX: 35.16 KG/M2 | HEART RATE: 86 BPM | OXYGEN SATURATION: 98 % | RESPIRATION RATE: 20 BRPM

## 2020-04-01 PROCEDURE — 6370000000 HC RX 637 (ALT 250 FOR IP): Performed by: OBSTETRICS & GYNECOLOGY

## 2020-04-01 RX ADMIN — DOCUSATE SODIUM 100 MG: 100 CAPSULE, LIQUID FILLED ORAL at 08:41

## 2020-04-01 RX ADMIN — ACETAMINOPHEN 650 MG: 325 TABLET ORAL at 08:41

## 2020-04-01 ASSESSMENT — PAIN SCALES - GENERAL: PAINLEVEL_OUTOF10: 3

## 2020-04-01 ASSESSMENT — PAIN DESCRIPTION - PROGRESSION: CLINICAL_PROGRESSION: GRADUALLY WORSENING

## 2020-04-01 NOTE — LACTATION NOTE
Mom reports baby is nursing well with the shield, also pumping to establish milk supply. Encouraged frequent feeds to establish milk supply. Reviewed benefits and safety of skin to skin. Inst on adequate I/O and importance of keeping track of diapers at home. Instructed on signs of dehydration such as infant refusing to feed, decreased wet diapers and infant becoming listless and notify provider if these occur. Latch and Learn Information given ( on hold at this time) as well as lactation office # if follow-up needed. Encouraged to call with any concerns.

## 2020-04-01 NOTE — PROGRESS NOTES
Progress Note    SUBJECTIVE: patient status post vaginal delivery day 2 doing well ,no complaints normal postpartum course    OBJECTIVE:    VITALS:  /86   Pulse 86   Temp 98.1 °F (36.7 °C) (Oral)   Resp 16   Ht 5' 5\" (1.651 m)   Wt 211 lb (95.7 kg)   LMP 07/01/2019   SpO2 98%   Breastfeeding Unknown   BMI 35.11 kg/m²   Physical Exam  lung:cta  Heart : regular rythm  Abdomen:soft non tender ,firm uterus ,bs present  Extremities :normal no evidence of DVT  DATA:  CBC:   Lab Results   Component Value Date    WBC 12.2 03/29/2020    RBC 3.56 03/29/2020    HGB 11.1 03/31/2020    HCT 30.1 03/31/2020    MCV 94.7 03/29/2020    MCH 32.9 03/29/2020    MCHC 34.7 03/29/2020    RDW 12.5 03/29/2020     03/29/2020    MPV 11.0 03/29/2020       ASSESSMENT AND PLAN:  Patient Active Problem List   Diagnosis    Decreased fetal movement affecting management of mother, antepartum    35 weeks gestation of pregnancy    36 weeks gestation of pregnancy    Elevated blood pressure affecting pregnancy in third trimester, antepartum    Positive GBS test    32 weeks gestation of pregnancy    Supervision of low-risk pregnancy, third trimester       Normal post partum care   Anticipate discharge home

## 2022-03-15 NOTE — H&P
Department of Obstetrics and Gynecology  Attending Obstetrics History and Physical        CHIEF COMPLAINT:  decreased fetal movement and back pain    HISTORY OF PRESENT ILLNESS:      The patient is a 25 y.o.  3 parity 0 at 32.4 weeks. Patient presents with a chief complaint as above and is being admitted for evaluation of decreased fetal movement. She has some swelling and concerned about decrease fetal movement. OB History    Para Term  AB Living   3       2     SAB TAB Ectopic Molar Multiple Live Births   1                # Outcome Date GA Lbr Aaron/2nd Weight Sex Delivery Anes PTL Lv   3 Current            2 SAB 2019           1 AB              Past Medical History:        Diagnosis Date    Streptococcus B carrier state complicating childbirth      Past Surgical History:        Procedure Laterality Date    DILATION AND CURETTAGE OF UTERUS  2019     Social History:    TOBACCO:   reports that she has never smoked. She has never used smokeless tobacco.  ETOH:   reports previous alcohol use. DRUGS:   reports no history of drug use. Family History:       Problem Relation Age of Onset   [facility] Cancer Mother      Medications Prior to Admission:  Medications Prior to Admission: Prenatal Vit-Fe Fumarate-FA (PRENATAL VITAMIN PO), Take 1 tablet by mouth daily  acetaminophen (TYLENOL) 325 MG tablet, Take 500 mg by mouth every 6 hours as needed for Pain  Allergies:  Macrobid [nitrofurantoin macrocrystal];  Penicillins; and Vicodin [hydrocodone-acetaminophen]    REVIEW OF SYSTEMS:      CONSTITUTIONAL:  negative  RESPIRATORY:  negative  CARDIOVASCULAR:  negative  GASTROINTESTINAL:  negative  GENITOURINARY:  negative  MUSCULOSKELETAL:  negative  NEUROLOGICAL:  negative  BEHAVIOR/PSYCH:  negative    PHYSICAL EXAM:    General appearance:  awake, alert, cooperative, no apparent distress, and appears stated age  Neurologic:  Normal DTRs  Abdomen:  Gravid, soft non-tender  Fetal heart rate:  Baseline Heart Rate 145, accelerations:  present    Contraction frequency:  0 minutes  Membranes:  Intact    ASSESSMENT AND PLAN:    The patient is a 25 y.o.  3 parity 0 at 32 weeks  Decrease fetal movement    Check BPs  Check UA  General diet   NST    If normal- then home    Isela Garcia  2020 severe

## 2024-02-16 ENCOUNTER — TELEPHONE (OUTPATIENT)
Dept: ADMINISTRATIVE | Age: 29
End: 2024-02-16

## 2024-02-16 ENCOUNTER — TELEPHONE (OUTPATIENT)
Dept: OBGYN | Age: 29
End: 2024-02-16

## 2024-02-16 NOTE — TELEPHONE ENCOUNTER
Leyda from Kerry Stallworth's office called and requested to schedule an urgent referral.  She is faxing the referral.  She said pt needs to be seen for a missed .  Staff unavailable due to pt care.  Please contact Leyda.

## 2024-02-16 NOTE — TELEPHONE ENCOUNTER
Leyda from Kerry Stallworth's office called and requested to schedule patient for a missed AB. Who and where can I put this patient.

## 2024-02-21 DIAGNOSIS — O20.0 THREATENED ABORTION: Primary | ICD-10-CM

## 2024-02-21 PROBLEM — O16.3 ELEVATED BLOOD PRESSURE AFFECTING PREGNANCY IN THIRD TRIMESTER, ANTEPARTUM: Status: RESOLVED | Noted: 2020-03-10 | Resolved: 2024-02-21

## 2024-02-21 PROBLEM — O36.8190 DECREASED FETAL MOVEMENT AFFECTING MANAGEMENT OF MOTHER, ANTEPARTUM: Status: RESOLVED | Noted: 2020-02-14 | Resolved: 2024-02-21

## 2024-02-21 PROBLEM — Z3A.35 35 WEEKS GESTATION OF PREGNANCY: Status: RESOLVED | Noted: 2020-03-07 | Resolved: 2024-02-21

## 2024-02-21 PROBLEM — Z34.93 SUPERVISION OF LOW-RISK PREGNANCY, THIRD TRIMESTER: Status: RESOLVED | Noted: 2020-03-29 | Resolved: 2024-02-21

## 2024-02-21 PROBLEM — Z3A.36 36 WEEKS GESTATION OF PREGNANCY: Status: RESOLVED | Noted: 2020-03-10 | Resolved: 2024-02-21

## 2024-02-21 PROBLEM — Z3A.32 32 WEEKS GESTATION OF PREGNANCY: Status: RESOLVED | Noted: 2020-03-11 | Resolved: 2024-02-21

## 2024-02-21 PROBLEM — B95.1 POSITIVE GBS TEST: Status: RESOLVED | Noted: 2020-03-10 | Resolved: 2024-02-21

## 2024-02-21 NOTE — PROGRESS NOTES
Patient of Dr. Aranza Stallworth's ultrasound performed 6-week 1 day no cardiac activity associated with bleeding needs repeat ultrasound for confirmation

## 2024-02-28 ENCOUNTER — TELEPHONE (OUTPATIENT)
Dept: OBGYN | Age: 29
End: 2024-02-28

## 2024-02-28 ENCOUNTER — INITIAL PRENATAL (OUTPATIENT)
Dept: OBGYN CLINIC | Age: 29
End: 2024-02-28
Payer: MEDICAID

## 2024-02-28 ENCOUNTER — INITIAL PRENATAL (OUTPATIENT)
Dept: OBGYN | Age: 29
End: 2024-02-28
Payer: MEDICAID

## 2024-02-28 ENCOUNTER — ANCILLARY PROCEDURE (OUTPATIENT)
Dept: OBGYN CLINIC | Age: 29
End: 2024-02-28
Payer: MEDICAID

## 2024-02-28 VITALS — SYSTOLIC BLOOD PRESSURE: 121 MMHG | BODY MASS INDEX: 38.17 KG/M2 | WEIGHT: 229.4 LBS | DIASTOLIC BLOOD PRESSURE: 88 MMHG

## 2024-02-28 VITALS
WEIGHT: 229.4 LBS | BODY MASS INDEX: 38.17 KG/M2 | DIASTOLIC BLOOD PRESSURE: 88 MMHG | SYSTOLIC BLOOD PRESSURE: 121 MMHG | HEART RATE: 74 BPM

## 2024-02-28 DIAGNOSIS — O02.1 MISSED AB: Primary | ICD-10-CM

## 2024-02-28 DIAGNOSIS — Z34.90 PREGNANCY, UNSPECIFIED GESTATIONAL AGE: ICD-10-CM

## 2024-02-28 DIAGNOSIS — O02.0 ANEMBRYONIC PREGNANCY: Primary | ICD-10-CM

## 2024-02-28 PROCEDURE — 99203 OFFICE O/P NEW LOW 30 MIN: CPT | Performed by: OBSTETRICS & GYNECOLOGY

## 2024-02-28 PROCEDURE — 76817 TRANSVAGINAL US OBSTETRIC: CPT | Performed by: OBSTETRICS & GYNECOLOGY

## 2024-02-28 PROCEDURE — 99999 PR OFFICE/OUTPT VISIT,PROCEDURE ONLY: CPT | Performed by: OBSTETRICS & GYNECOLOGY

## 2024-02-28 RX ORDER — MIFEPRISTONE 200 MG/1
200 TABLET ORAL ONCE
Qty: 1 TABLET | Refills: 0 | Status: SHIPPED | OUTPATIENT
Start: 2024-02-28 | End: 2024-02-28

## 2024-02-28 RX ORDER — MISOPROSTOL 100 UG/1
TABLET ORAL
Qty: 6 TABLET | Refills: 0 | Status: SHIPPED | OUTPATIENT
Start: 2024-02-28

## 2024-02-28 SDOH — ECONOMIC STABILITY: FOOD INSECURITY: WITHIN THE PAST 12 MONTHS, YOU WORRIED THAT YOUR FOOD WOULD RUN OUT BEFORE YOU GOT MONEY TO BUY MORE.: NEVER TRUE

## 2024-02-28 SDOH — ECONOMIC STABILITY: FOOD INSECURITY: WITHIN THE PAST 12 MONTHS, THE FOOD YOU BOUGHT JUST DIDN'T LAST AND YOU DIDN'T HAVE MONEY TO GET MORE.: NEVER TRUE

## 2024-02-28 SDOH — ECONOMIC STABILITY: INCOME INSECURITY: HOW HARD IS IT FOR YOU TO PAY FOR THE VERY BASICS LIKE FOOD, HOUSING, MEDICAL CARE, AND HEATING?: NOT HARD AT ALL

## 2024-02-28 SDOH — ECONOMIC STABILITY: HOUSING INSECURITY
IN THE LAST 12 MONTHS, WAS THERE A TIME WHEN YOU DID NOT HAVE A STEADY PLACE TO SLEEP OR SLEPT IN A SHELTER (INCLUDING NOW)?: NO

## 2024-02-28 ASSESSMENT — PATIENT HEALTH QUESTIONNAIRE - PHQ9
SUM OF ALL RESPONSES TO PHQ QUESTIONS 1-9: 0
SUM OF ALL RESPONSES TO PHQ9 QUESTIONS 1 & 2: 0
SUM OF ALL RESPONSES TO PHQ QUESTIONS 1-9: 0
2. FEELING DOWN, DEPRESSED OR HOPELESS: 0
SUM OF ALL RESPONSES TO PHQ QUESTIONS 1-9: 0
SUM OF ALL RESPONSES TO PHQ QUESTIONS 1-9: 0

## 2024-02-28 NOTE — PROGRESS NOTES
Bradford Rivera is a 29-year-old G4, P1 female who is known to have a missed AB today there is a gestational sac no yolk sac, no embryo.  She reports no bleeding or cramping.    Patient presents for missed AB    Past Medical History:   Diagnosis Date    hx ofPIH (pregnancy induced hypertension)         Past Surgical History:   Procedure Laterality Date    DILATION AND CURETTAGE OF UTERUS  05/2019        Family History   Problem Relation Age of Onset    Cancer Mother     Ovarian Cancer Maternal Grandmother         Social History       Tobacco History       Smoking Status  Never      Passive Exposure  Never      Smokeless Tobacco Use  Never              Alcohol History       Alcohol Use Status  Not Currently              Drug Use       Drug Use Status  Never              Sexual Activity       Sexually Active  Yes                      Current Outpatient Medications:     miFEPRIStone (KORLYM/MIFEPREX) 200 MG tablet, Take 1 tablet by mouth once for 1 dose, Disp: 1 tablet, Rfl: 0    miSOPROStol (CYTOTEC) 100 MCG tablet, Dissolve 3 tablets underneath your tongue every 3 hours x 2 if needed, Disp: 6 tablet, Rfl: 0    ALPRAZolam (XANAX) 0.5 MG tablet, Take 1 tablet by mouth nightly as needed for Sleep., Disp: , Rfl:      Allergies   Allergen Reactions    Macrobid [Nitrofurantoin Macrocrystal] Nausea And Vomiting    Penicillins Diarrhea and Nausea And Vomiting    Vicodin [Hydrocodone-Acetaminophen] Diarrhea and Nausea And Vomiting        Vitals:    02/28/24 0844   BP: 121/88   Pulse: 74        No exam was performed today.                                                Bradford was seen today for new patient and initial prenatal visit.    Diagnoses and all orders for this visit:    Missed ab  -     miFEPRIStone (KORLYM/MIFEPREX) 200 MG tablet; Take 1 tablet by mouth once for 1 dose  -     miSOPROStol (CYTOTEC) 100 MCG tablet; Dissolve 3 tablets underneath your tongue every 3 hours x 2 if needed      We discussed management options

## 2024-02-28 NOTE — TELEPHONE ENCOUNTER
Received 2 vm's on phone from patient. She stated there was an issue with the medications called in to [facility] and needed a call back. Also received a vm from the pharmacy stating that the one medication was not available in brand or generic.   Returned call to pharmacy and verified with them that is was not available to the. Provider was notified and she stated that our  was calling around to find where the medication could be obtained.  Return call made to patient and went to vm. Left patient a message stating that we would call her back regarding the medication issues if any new developments.

## 2024-02-28 NOTE — TELEPHONE ENCOUNTER
Patient called back. Took pills and no bleeding yet. Took at 3pm. Advised to take 2nd dose at 6pm. Will call back tomorrow if no bleeding.

## 2024-02-28 NOTE — PROGRESS NOTES
24    Lu Ayala, DO  [address]  [address] [place] [address]     RE:  BRADFORD RIVERA  : 1995   AGE: 29 y.o.      Dear Dr. Ayala:    Bradford Rivera was scheduled for a fetal ultrasound assessment only.  An ultrasound report is included under the imaging tab from today's date.    Transvaginal ultrasound imaging was performed.  An intrauterine gestational sac was identified measuring 24.7 mm.  There was no evidence of an embryo or yolk sac.  There were no apparent adnexal masses.  There was no significant free peritoneal fluid.  The findings are consistent with an anembryonic pregnancy.    If you have any questions regarding her management, please contact me at your convenience and thank you for allowing me to participate in her care    Sincerely,        Trey Edwards MD, MS, FACOG, FACS, RDCS, RDMS, RVT  Director Maternal-Fetal Medicine  [facility]  [phone]

## 2024-02-28 NOTE — PROGRESS NOTES
New patient here for initial prenatal visit. Patient unable to provide urine specimen. Previously seen by Dr Kerry Stallworth. Patient has US at [facility] today.

## 2024-02-29 ENCOUNTER — TELEPHONE (OUTPATIENT)
Dept: OBGYN | Age: 29
End: 2024-02-29

## 2024-02-29 DIAGNOSIS — O03.9 COMPLETE ABORTION: Primary | ICD-10-CM

## 2024-02-29 NOTE — TELEPHONE ENCOUNTER
Spoke with patient this morning. She said she had significant bleeding with some clots and cramping after her 2nd dose that is now slowing down. Wants to know if she needs a f/u from that. I did advise her that she does not need the other medication. She also said she is interested in depo for cycle control. She mentioned she was to come back in 2 weeks she thought but wanted me to verify with you.

## 2024-02-29 NOTE — TELEPHONE ENCOUNTER
----- Message from Lu Ayala DO sent at 2/28/2024  3:52 PM EST -----  Regarding: Mifepristone  I have not heard back yet from Dary regarding obtaining the mifepristone so she can use the Cytotec by itself.  Studies does show that using the 2 combined may slightly increase the success rate.  But at her early stage she should still have very good results using Cytotec alone

## 2024-02-29 NOTE — PROGRESS NOTES
Use Cytotec x 2 with significant bleeding that is now decreased will follow hCG levels follow-up in 2 to 3 weeks for Depo

## 2024-03-19 ENCOUNTER — TELEPHONE (OUTPATIENT)
Dept: OBGYN | Age: 29
End: 2024-03-19

## 2024-03-19 NOTE — TELEPHONE ENCOUNTER
There was a vm on phone from Kandy at [facility]. It stated that patient was seen today by Bridgett Sylvester and she wants the patient seen again STAT by provider. They will send a referral over. [phone]

## 2024-03-20 ENCOUNTER — TELEPHONE (OUTPATIENT)
Dept: OBGYN | Age: 29
End: 2024-03-20

## 2024-03-20 NOTE — TELEPHONE ENCOUNTER
Called and spoke with patient. Instructed patient that she has already discussed with provider that procedure so she does not need to keep appointment for tomorrow and that we will call her Friday with a surgery time. Patient verbalized understanding. Instructed on not using any blood thinners such as aspirin or ibuprofen. Appointment in [facility] office tomorrow cancelled.

## 2024-03-21 ENCOUNTER — PREP FOR PROCEDURE (OUTPATIENT)
Dept: OBGYN | Age: 29
End: 2024-03-21

## 2024-03-21 DIAGNOSIS — O02.1 MISSED ABORTION: ICD-10-CM

## 2024-03-22 ENCOUNTER — TELEPHONE (OUTPATIENT)
Dept: OBGYN | Age: 29
End: 2024-03-22

## 2024-03-22 ENCOUNTER — HOSPITAL ENCOUNTER (OUTPATIENT)
Age: 29
Setting detail: OBSERVATION
Discharge: HOME | End: 2024-03-24
Attending: STUDENT IN AN ORGANIZED HEALTH CARE EDUCATION/TRAINING PROGRAM | Admitting: OBSTETRICS & GYNECOLOGY
Payer: MEDICAID

## 2024-03-22 ENCOUNTER — APPOINTMENT (OUTPATIENT)
Dept: ULTRASOUND IMAGING | Age: 29
End: 2024-03-22
Payer: MEDICAID

## 2024-03-22 ENCOUNTER — APPOINTMENT (OUTPATIENT)
Dept: CT IMAGING | Age: 29
End: 2024-03-22
Payer: MEDICAID

## 2024-03-22 DIAGNOSIS — N93.8 DYSFUNCTIONAL UTERINE BLEEDING: Primary | ICD-10-CM

## 2024-03-22 DIAGNOSIS — O02.1 MISSED ABORTION: ICD-10-CM

## 2024-03-22 DIAGNOSIS — N93.9 VAGINAL BLEEDING: ICD-10-CM

## 2024-03-22 LAB
ABO + RH BLD: NORMAL
ALBUMIN SERPL-MCNC: 4.2 G/DL (ref 3.5–5.2)
ALP SERPL-CCNC: 70 U/L (ref 35–104)
ALT SERPL-CCNC: 13 U/L (ref 0–32)
ANION GAP SERPL CALCULATED.3IONS-SCNC: 10 MMOL/L (ref 7–16)
ARM BAND NUMBER: NORMAL
AST SERPL-CCNC: 16 U/L (ref 0–31)
B-HCG SERPL EIA 3RD IS-ACNC: 4618 MIU/ML (ref 0–7)
BASOPHILS # BLD: 0.04 K/UL (ref 0–0.2)
BASOPHILS NFR BLD: 1 % (ref 0–2)
BILIRUB SERPL-MCNC: 0.3 MG/DL (ref 0–1.2)
BILIRUB UR QL STRIP: ABNORMAL
BLOOD BANK SAMPLE EXPIRATION: NORMAL
BLOOD GROUP ANTIBODIES SERPL: NEGATIVE
BUN SERPL-MCNC: 14 MG/DL (ref 6–20)
CALCIUM SERPL-MCNC: 9.4 MG/DL (ref 8.6–10.2)
CHLORIDE SERPL-SCNC: 106 MMOL/L (ref 98–107)
CLARITY UR: ABNORMAL
CO2 SERPL-SCNC: 26 MMOL/L (ref 22–29)
COLOR UR: ABNORMAL
CREAT SERPL-MCNC: 0.9 MG/DL (ref 0.5–1)
EOSINOPHIL # BLD: 0.07 K/UL (ref 0.05–0.5)
EOSINOPHILS RELATIVE PERCENT: 1 % (ref 0–6)
ERYTHROCYTE [DISTWIDTH] IN BLOOD BY AUTOMATED COUNT: 11.9 % (ref 11.5–15)
GFR SERPL CREATININE-BSD FRML MDRD: >60 ML/MIN/1.73M2
GLUCOSE SERPL-MCNC: 113 MG/DL (ref 74–99)
GLUCOSE UR STRIP-MCNC: 250 MG/DL
HCT VFR BLD AUTO: 37.3 % (ref 34–48)
HGB BLD-MCNC: 12.4 G/DL (ref 11.5–15.5)
HGB UR QL STRIP.AUTO: ABNORMAL
IMM GRANULOCYTES # BLD AUTO: 0.04 K/UL (ref 0–0.58)
IMM GRANULOCYTES NFR BLD: 1 % (ref 0–5)
KETONES UR STRIP-MCNC: ABNORMAL MG/DL
LEUKOCYTE ESTERASE UR QL STRIP: ABNORMAL
LYMPHOCYTES NFR BLD: 1.94 K/UL (ref 1.5–4)
LYMPHOCYTES RELATIVE PERCENT: 23 % (ref 20–42)
MCH RBC QN AUTO: 30.5 PG (ref 26–35)
MCHC RBC AUTO-ENTMCNC: 33.2 G/DL (ref 32–34.5)
MCV RBC AUTO: 91.6 FL (ref 80–99.9)
MONOCYTES NFR BLD: 0.37 K/UL (ref 0.1–0.95)
MONOCYTES NFR BLD: 4 % (ref 2–12)
NEUTROPHILS NFR BLD: 71 % (ref 43–80)
NEUTS SEG NFR BLD: 6.18 K/UL (ref 1.8–7.3)
NITRITE UR QL STRIP: POSITIVE
PH UR STRIP: 5 [PH] (ref 5–9)
PLATELET # BLD AUTO: 196 K/UL (ref 130–450)
PMV BLD AUTO: 10.8 FL (ref 7–12)
POTASSIUM SERPL-SCNC: 3.9 MMOL/L (ref 3.5–5)
PROT SERPL-MCNC: 6.8 G/DL (ref 6.4–8.3)
PROT UR STRIP-MCNC: 100 MG/DL
RBC # BLD AUTO: 4.07 M/UL (ref 3.5–5.5)
RBC #/AREA URNS HPF: ABNORMAL /HPF
SODIUM SERPL-SCNC: 142 MMOL/L (ref 132–146)
SP GR UR STRIP: >1.03 (ref 1–1.03)
UROBILINOGEN UR STRIP-ACNC: 1 EU/DL (ref 0–1)
WBC #/AREA URNS HPF: ABNORMAL /HPF
WBC OTHER # BLD: 8.6 K/UL (ref 4.5–11.5)

## 2024-03-22 PROCEDURE — G0378 HOSPITAL OBSERVATION PER HR: HCPCS

## 2024-03-22 PROCEDURE — 84702 CHORIONIC GONADOTROPIN TEST: CPT

## 2024-03-22 PROCEDURE — 74177 CT ABD & PELVIS W/CONTRAST: CPT

## 2024-03-22 PROCEDURE — 6370000000 HC RX 637 (ALT 250 FOR IP): Performed by: NURSE PRACTITIONER

## 2024-03-22 PROCEDURE — 81001 URINALYSIS AUTO W/SCOPE: CPT

## 2024-03-22 PROCEDURE — 6360000004 HC RX CONTRAST MEDICATION: Performed by: RADIOLOGY

## 2024-03-22 PROCEDURE — 99285 EMERGENCY DEPT VISIT HI MDM: CPT

## 2024-03-22 PROCEDURE — 86901 BLOOD TYPING SEROLOGIC RH(D): CPT

## 2024-03-22 PROCEDURE — 85025 COMPLETE CBC W/AUTO DIFF WBC: CPT

## 2024-03-22 PROCEDURE — 76817 TRANSVAGINAL US OBSTETRIC: CPT

## 2024-03-22 PROCEDURE — 6370000000 HC RX 637 (ALT 250 FOR IP)

## 2024-03-22 PROCEDURE — 80053 COMPREHEN METABOLIC PANEL: CPT

## 2024-03-22 PROCEDURE — 86900 BLOOD TYPING SEROLOGIC ABO: CPT

## 2024-03-22 PROCEDURE — 87086 URINE CULTURE/COLONY COUNT: CPT

## 2024-03-22 PROCEDURE — 86850 RBC ANTIBODY SCREEN: CPT

## 2024-03-22 RX ORDER — OXYCODONE HYDROCHLORIDE AND ACETAMINOPHEN 5; 325 MG/1; MG/1
1 TABLET ORAL ONCE
Status: COMPLETED | OUTPATIENT
Start: 2024-03-22 | End: 2024-03-22

## 2024-03-22 RX ORDER — ONDANSETRON 4 MG/1
4 TABLET, ORALLY DISINTEGRATING ORAL ONCE
Status: COMPLETED | OUTPATIENT
Start: 2024-03-22 | End: 2024-03-22

## 2024-03-22 RX ORDER — ACETAMINOPHEN 500 MG
1000 TABLET ORAL ONCE
Status: COMPLETED | OUTPATIENT
Start: 2024-03-22 | End: 2024-03-22

## 2024-03-22 RX ADMIN — ONDANSETRON 4 MG: 4 TABLET, ORALLY DISINTEGRATING ORAL at 18:43

## 2024-03-22 RX ADMIN — IOPAMIDOL 75 ML: 755 INJECTION, SOLUTION INTRAVENOUS at 21:59

## 2024-03-22 RX ADMIN — OXYCODONE AND ACETAMINOPHEN 1 TABLET: 5; 325 TABLET ORAL at 20:14

## 2024-03-22 RX ADMIN — ACETAMINOPHEN 1000 MG: 500 TABLET ORAL at 18:42

## 2024-03-22 ASSESSMENT — PAIN SCALES - GENERAL
PAINLEVEL_OUTOF10: 7
PAINLEVEL_OUTOF10: 10

## 2024-03-22 ASSESSMENT — PAIN DESCRIPTION - DESCRIPTORS: DESCRIPTORS: CRAMPING

## 2024-03-22 ASSESSMENT — PAIN DESCRIPTION - LOCATION
LOCATION: ABDOMEN
LOCATION: ABDOMEN

## 2024-03-22 NOTE — ED NOTES
Pt checking into er with kulwinder guerra. Advised pt not to eat or drink until she is clear by provider

## 2024-03-22 NOTE — ED PROVIDER NOTES
Shared ARON-ED Attending Visit.  CC: No        [facility]  Department of Emergency Medicine   ED  Encounter Note  Admit Date/RoomTime: 3/22/2024  7:44 PM  ED Room:     NAME: Bradford Rivera  : 1995  MRN: [id number]     Chief Complaint:  Vaginal Bleeding (Heavy vaginal bleeding, onset last night. Patient is 7 weeks pregnant. Passing large clots. Going through around 12 pads every 2 hours, per pt.), Abdominal Cramping (Lower abd cramping, radiates into mid lower back. ), and Dizziness    History of Present Illness       Bradford Rivera is a 29 y.o. old female who presents to the emergency department with ongoing heavy vaginal bleeding.  Patient is a G3, P1 status post vaginal delivery in  with gestational hypertension and fetal decelerations as complications.  Patient had an IUD that was removed in December as it was causing a lot of discomfort and problems.  She was placed on oral contraceptive medication.  She has not had a menstrual period since December.  Patient was found to be pregnant in mid February,, patient had an ultrasound completed on 2024, this was concerning for an intrauterine gestational sac with no evidence of embryo or yolk sac, no adnexal masses, no peritoneal free fluid, this was consistent with an embryonic pregnancy.  Patient was then placed on Cytotec.  Patient states that she began vaginally bleeding after taking the Cytotec.  Patient states that she has been persistently vaginally bleeding since that time.  Over the last 2 to 3 days that she has developed large clots and passage of what appears to be tissue, states that she is going through multiple pads every 2 hours.  States that she is having lower abdominal cramping this radiating to her low back.  She has not had any fevers, denies any dysuria.  Has an appointment on 3/26/2024 for a D&C.    ROS   Pertinent positives and negatives are stated within HPI, all other systems reviewed and are

## 2024-03-22 NOTE — ED NOTES
Department of Emergency Medicine  FIRST PROVIDER TRIAGE NOTE             Independent MLP           3/22/24  4:44 PM EDT    Date of Encounter: 3/22/24   MRN: [id number]      HPI: Bradford Rivera is a 29 y.o. female who presents to the ED for Vaginal Bleeding (Heavy vaginal bleeding, onset last night. Patient is 7 weeks pregnant. Passing large clots. Going through around 12 pads every 2 hours, per pt.), Abdominal Cramping (Lower abd cramping, radiates into mid lower back. ), and Dizziness     PATIENT'S OB SENT HER IN FOR EVALUATION.  HAS HAD 3 MISCARRIAGES IN THE PAST.  4 PREGNANCIES, ONE LIVING. CHILD.  BLEEDING SUBSIDED THIS MORNING BUT GOT WORSE AGAIN.     ROS: Negative for cp or sob.    PE: Gen Appearance/Constitutional: alert  HEENT: NC/NT. PERRLA,  Airway patent.     Initial Plan of Care: All treatment areas with department are currently occupied. Plan to order/Initiate the following while awaiting opening in ED.  Initiate Treatment-Testing, Proceed toTreatment Area When Bed Available for ED Attending/MLP to Continue Care    Electronically signed by ROXANA Cates CNP   DD: 3/22/24      Pablito Villar APRN - CNP  03/22/24 5015

## 2024-03-22 NOTE — TELEPHONE ENCOUNTER
Patient called and said she started bleeding yesterday with some very heavy cramping and expelled a large clot that she felt was the fetus. States she bled through 10-12 pads in a 2 hour time frame. Continues bleeding but cramps have decreased and so has the bleeding to a normal period. Advised to go to the ed for assess. Patient will notify this office with any updates. Instructed her I will not cancel her surgery on Tuesday until we know better what has happened. Patient in agreement.

## 2024-03-23 ENCOUNTER — ANESTHESIA EVENT (OUTPATIENT)
Dept: OPERATING ROOM | Age: 29
End: 2024-03-23
Payer: MEDICAID

## 2024-03-23 ENCOUNTER — ANESTHESIA (OUTPATIENT)
Dept: OPERATING ROOM | Age: 29
End: 2024-03-23
Payer: MEDICAID

## 2024-03-23 PROBLEM — N93.8 DYSFUNCTIONAL UTERINE BLEEDING: Status: RESOLVED | Noted: 2024-03-22 | Resolved: 2024-03-23

## 2024-03-23 PROBLEM — O02.1 MISSED ABORTION: Status: RESOLVED | Noted: 2024-03-21 | Resolved: 2024-03-23

## 2024-03-23 PROCEDURE — 7100000000 HC PACU RECOVERY - FIRST 15 MIN: Performed by: OBSTETRICS & GYNECOLOGY

## 2024-03-23 PROCEDURE — 6360000002 HC RX W HCPCS: Performed by: OBSTETRICS & GYNECOLOGY

## 2024-03-23 PROCEDURE — 3600000002 HC SURGERY LEVEL 2 BASE: Performed by: OBSTETRICS & GYNECOLOGY

## 2024-03-23 PROCEDURE — 2500000003 HC RX 250 WO HCPCS: Performed by: NURSE ANESTHETIST, CERTIFIED REGISTERED

## 2024-03-23 PROCEDURE — G0378 HOSPITAL OBSERVATION PER HR: HCPCS

## 2024-03-23 PROCEDURE — 6360000002 HC RX W HCPCS: Performed by: NURSE ANESTHETIST, CERTIFIED REGISTERED

## 2024-03-23 PROCEDURE — 2580000003 HC RX 258: Performed by: OBSTETRICS & GYNECOLOGY

## 2024-03-23 PROCEDURE — 3600000012 HC SURGERY LEVEL 2 ADDTL 15MIN: Performed by: OBSTETRICS & GYNECOLOGY

## 2024-03-23 PROCEDURE — 6360000002 HC RX W HCPCS: Performed by: NURSE PRACTITIONER

## 2024-03-23 PROCEDURE — 3700000000 HC ANESTHESIA ATTENDED CARE: Performed by: OBSTETRICS & GYNECOLOGY

## 2024-03-23 PROCEDURE — 6360000002 HC RX W HCPCS: Performed by: ANESTHESIOLOGY

## 2024-03-23 PROCEDURE — 88305 TISSUE EXAM BY PATHOLOGIST: CPT

## 2024-03-23 PROCEDURE — 7100000001 HC PACU RECOVERY - ADDTL 15 MIN: Performed by: OBSTETRICS & GYNECOLOGY

## 2024-03-23 PROCEDURE — 6370000000 HC RX 637 (ALT 250 FOR IP): Performed by: OBSTETRICS & GYNECOLOGY

## 2024-03-23 PROCEDURE — 3700000001 HC ADD 15 MINUTES (ANESTHESIA): Performed by: OBSTETRICS & GYNECOLOGY

## 2024-03-23 PROCEDURE — 2580000003 HC RX 258: Performed by: NURSE PRACTITIONER

## 2024-03-23 PROCEDURE — 2709999900 HC NON-CHARGEABLE SUPPLY: Performed by: OBSTETRICS & GYNECOLOGY

## 2024-03-23 RX ORDER — SODIUM CHLORIDE, SODIUM LACTATE, POTASSIUM CHLORIDE, CALCIUM CHLORIDE 600; 310; 30; 20 MG/100ML; MG/100ML; MG/100ML; MG/100ML
INJECTION, SOLUTION INTRAVENOUS CONTINUOUS
Status: DISCONTINUED | OUTPATIENT
Start: 2024-03-23 | End: 2024-03-24 | Stop reason: HOSPADM

## 2024-03-23 RX ORDER — SODIUM CHLORIDE 0.9 % (FLUSH) 0.9 %
5-40 SYRINGE (ML) INJECTION PRN
Status: DISCONTINUED | OUTPATIENT
Start: 2024-03-23 | End: 2024-03-23 | Stop reason: HOSPADM

## 2024-03-23 RX ORDER — FENTANYL CITRATE 50 UG/ML
INJECTION, SOLUTION INTRAMUSCULAR; INTRAVENOUS PRN
Status: DISCONTINUED | OUTPATIENT
Start: 2024-03-23 | End: 2024-03-23 | Stop reason: SDUPTHER

## 2024-03-23 RX ORDER — SODIUM CHLORIDE 9 MG/ML
INJECTION, SOLUTION INTRAVENOUS PRN
Status: DISCONTINUED | OUTPATIENT
Start: 2024-03-23 | End: 2024-03-23 | Stop reason: HOSPADM

## 2024-03-23 RX ORDER — NALOXONE HYDROCHLORIDE 0.4 MG/ML
INJECTION, SOLUTION INTRAMUSCULAR; INTRAVENOUS; SUBCUTANEOUS PRN
Status: DISCONTINUED | OUTPATIENT
Start: 2024-03-23 | End: 2024-03-23 | Stop reason: HOSPADM

## 2024-03-23 RX ORDER — LIDOCAINE HYDROCHLORIDE 20 MG/ML
INJECTION, SOLUTION EPIDURAL; INFILTRATION; INTRACAUDAL; PERINEURAL PRN
Status: DISCONTINUED | OUTPATIENT
Start: 2024-03-23 | End: 2024-03-23 | Stop reason: SDUPTHER

## 2024-03-23 RX ORDER — KETOROLAC TROMETHAMINE 15 MG/ML
30 INJECTION, SOLUTION INTRAMUSCULAR; INTRAVENOUS EVERY 6 HOURS PRN
Status: DISCONTINUED | OUTPATIENT
Start: 2024-03-23 | End: 2024-03-24 | Stop reason: HOSPADM

## 2024-03-23 RX ORDER — PROPOFOL 10 MG/ML
INJECTION, EMULSION INTRAVENOUS PRN
Status: DISCONTINUED | OUTPATIENT
Start: 2024-03-23 | End: 2024-03-23 | Stop reason: SDUPTHER

## 2024-03-23 RX ORDER — DIPHENHYDRAMINE HYDROCHLORIDE 50 MG/ML
INJECTION INTRAMUSCULAR; INTRAVENOUS PRN
Status: DISCONTINUED | OUTPATIENT
Start: 2024-03-23 | End: 2024-03-23 | Stop reason: SDUPTHER

## 2024-03-23 RX ORDER — KETOROLAC TROMETHAMINE 10 MG/1
10 TABLET, FILM COATED ORAL EVERY 6 HOURS PRN
Qty: 20 TABLET | Refills: 0 | Status: SHIPPED | OUTPATIENT
Start: 2024-03-23 | End: 2024-03-24

## 2024-03-23 RX ORDER — MEPERIDINE HYDROCHLORIDE 25 MG/ML
12.5 INJECTION INTRAMUSCULAR; INTRAVENOUS; SUBCUTANEOUS ONCE
Status: DISCONTINUED | OUTPATIENT
Start: 2024-03-23 | End: 2024-03-23 | Stop reason: HOSPADM

## 2024-03-23 RX ORDER — SODIUM CHLORIDE 0.9 % (FLUSH) 0.9 %
5-40 SYRINGE (ML) INJECTION EVERY 12 HOURS SCHEDULED
Status: DISCONTINUED | OUTPATIENT
Start: 2024-03-23 | End: 2024-03-23 | Stop reason: HOSPADM

## 2024-03-23 RX ORDER — DEXAMETHASONE SODIUM PHOSPHATE 4 MG/ML
INJECTION, SOLUTION INTRA-ARTICULAR; INTRALESIONAL; INTRAMUSCULAR; INTRAVENOUS; SOFT TISSUE PRN
Status: DISCONTINUED | OUTPATIENT
Start: 2024-03-23 | End: 2024-03-23 | Stop reason: SDUPTHER

## 2024-03-23 RX ORDER — LORAZEPAM 0.5 MG/1
0.5 TABLET ORAL NIGHTLY
Status: DISCONTINUED | OUTPATIENT
Start: 2024-03-23 | End: 2024-03-24 | Stop reason: HOSPADM

## 2024-03-23 RX ORDER — PROCHLORPERAZINE EDISYLATE 5 MG/ML
5 INJECTION INTRAMUSCULAR; INTRAVENOUS
Status: DISCONTINUED | OUTPATIENT
Start: 2024-03-23 | End: 2024-03-23 | Stop reason: HOSPADM

## 2024-03-23 RX ORDER — DIPHENHYDRAMINE HYDROCHLORIDE 50 MG/ML
12.5 INJECTION INTRAMUSCULAR; INTRAVENOUS
Status: DISCONTINUED | OUTPATIENT
Start: 2024-03-23 | End: 2024-03-23 | Stop reason: HOSPADM

## 2024-03-23 RX ORDER — SODIUM CHLORIDE, SODIUM LACTATE, POTASSIUM CHLORIDE, CALCIUM CHLORIDE 600; 310; 30; 20 MG/100ML; MG/100ML; MG/100ML; MG/100ML
INJECTION, SOLUTION INTRAVENOUS CONTINUOUS
Status: DISCONTINUED | OUTPATIENT
Start: 2024-03-23 | End: 2024-03-23 | Stop reason: HOSPADM

## 2024-03-23 RX ORDER — MIDAZOLAM HYDROCHLORIDE 1 MG/ML
INJECTION INTRAMUSCULAR; INTRAVENOUS PRN
Status: DISCONTINUED | OUTPATIENT
Start: 2024-03-23 | End: 2024-03-23 | Stop reason: SDUPTHER

## 2024-03-23 RX ORDER — ONDANSETRON 2 MG/ML
INJECTION INTRAMUSCULAR; INTRAVENOUS PRN
Status: DISCONTINUED | OUTPATIENT
Start: 2024-03-23 | End: 2024-03-23 | Stop reason: SDUPTHER

## 2024-03-23 RX ORDER — FENTANYL CITRATE 50 UG/ML
50 INJECTION, SOLUTION INTRAMUSCULAR; INTRAVENOUS EVERY 5 MIN PRN
Status: DISCONTINUED | OUTPATIENT
Start: 2024-03-23 | End: 2024-03-23 | Stop reason: HOSPADM

## 2024-03-23 RX ORDER — CEFOXITIN 2 G/1
INJECTION, POWDER, FOR SOLUTION INTRAVENOUS
Status: DISPENSED
Start: 2024-03-23 | End: 2024-03-24

## 2024-03-23 RX ORDER — IBUPROFEN 800 MG/1
400 TABLET ORAL EVERY 6 HOURS PRN
Status: DISCONTINUED | OUTPATIENT
Start: 2024-03-23 | End: 2024-03-24 | Stop reason: HOSPADM

## 2024-03-23 RX ADMIN — MIDAZOLAM 2 MG: 1 INJECTION INTRAMUSCULAR; INTRAVENOUS at 16:55

## 2024-03-23 RX ADMIN — PROPOFOL 200 MG: 10 INJECTION, EMULSION INTRAVENOUS at 17:00

## 2024-03-23 RX ADMIN — FENTANYL CITRATE 50 MCG: 50 INJECTION INTRAMUSCULAR; INTRAVENOUS at 17:35

## 2024-03-23 RX ADMIN — KETOROLAC TROMETHAMINE 30 MG: 15 INJECTION, SOLUTION INTRAMUSCULAR; INTRAVENOUS at 09:48

## 2024-03-23 RX ADMIN — FENTANYL CITRATE 50 MCG: 50 INJECTION INTRAMUSCULAR; INTRAVENOUS at 17:41

## 2024-03-23 RX ADMIN — WATER 2000 MG: 1 INJECTION INTRAMUSCULAR; INTRAVENOUS; SUBCUTANEOUS at 17:05

## 2024-03-23 RX ADMIN — DEXAMETHASONE SODIUM PHOSPHATE 10 MG: 4 INJECTION, SOLUTION INTRAMUSCULAR; INTRAVENOUS at 17:05

## 2024-03-23 RX ADMIN — DIPHENHYDRAMINE HYDROCHLORIDE 12.5 MG: 50 INJECTION, SOLUTION INTRAMUSCULAR; INTRAVENOUS at 17:08

## 2024-03-23 RX ADMIN — FENTANYL CITRATE 50 MCG: 50 INJECTION, SOLUTION INTRAMUSCULAR; INTRAVENOUS at 17:00

## 2024-03-23 RX ADMIN — ONDANSETRON 4 MG: 2 INJECTION INTRAMUSCULAR; INTRAVENOUS at 17:05

## 2024-03-23 RX ADMIN — KETOROLAC TROMETHAMINE 30 MG: 15 INJECTION, SOLUTION INTRAMUSCULAR; INTRAVENOUS at 01:21

## 2024-03-23 RX ADMIN — KETOROLAC TROMETHAMINE 30 MG: 15 INJECTION, SOLUTION INTRAMUSCULAR; INTRAVENOUS at 21:05

## 2024-03-23 RX ADMIN — CEFTRIAXONE 1000 MG: 1 INJECTION, POWDER, FOR SOLUTION INTRAMUSCULAR; INTRAVENOUS at 00:18

## 2024-03-23 RX ADMIN — SODIUM CHLORIDE, POTASSIUM CHLORIDE, SODIUM LACTATE AND CALCIUM CHLORIDE: 600; 310; 30; 20 INJECTION, SOLUTION INTRAVENOUS at 10:51

## 2024-03-23 RX ADMIN — LORAZEPAM 0.5 MG: 0.5 TABLET ORAL at 23:33

## 2024-03-23 RX ADMIN — PROPOFOL 40 MG: 10 INJECTION, EMULSION INTRAVENOUS at 17:02

## 2024-03-23 RX ADMIN — SODIUM CHLORIDE, POTASSIUM CHLORIDE, SODIUM LACTATE AND CALCIUM CHLORIDE: 600; 310; 30; 20 INJECTION, SOLUTION INTRAVENOUS at 16:55

## 2024-03-23 RX ADMIN — FENTANYL CITRATE 50 MCG: 50 INJECTION, SOLUTION INTRAMUSCULAR; INTRAVENOUS at 17:05

## 2024-03-23 RX ADMIN — SODIUM CHLORIDE, POTASSIUM CHLORIDE, SODIUM LACTATE AND CALCIUM CHLORIDE: 600; 310; 30; 20 INJECTION, SOLUTION INTRAVENOUS at 01:28

## 2024-03-23 RX ADMIN — LIDOCAINE HYDROCHLORIDE 100 MG: 20 INJECTION, SOLUTION EPIDURAL; INFILTRATION; INTRACAUDAL; PERINEURAL at 17:00

## 2024-03-23 ASSESSMENT — PAIN DESCRIPTION - PAIN TYPE
TYPE: SURGICAL PAIN
TYPE: ACUTE PAIN
TYPE: SURGICAL PAIN

## 2024-03-23 ASSESSMENT — PAIN DESCRIPTION - FREQUENCY: FREQUENCY: INTERMITTENT

## 2024-03-23 ASSESSMENT — LIFESTYLE VARIABLES
HOW MANY STANDARD DRINKS CONTAINING ALCOHOL DO YOU HAVE ON A TYPICAL DAY: PATIENT DOES NOT DRINK
SMOKING_STATUS: 0
HOW OFTEN DO YOU HAVE A DRINK CONTAINING ALCOHOL: NEVER

## 2024-03-23 ASSESSMENT — PAIN SCALES - GENERAL
PAINLEVEL_OUTOF10: 10
PAINLEVEL_OUTOF10: 4
PAINLEVEL_OUTOF10: 3
PAINLEVEL_OUTOF10: 0
PAINLEVEL_OUTOF10: 7
PAINLEVEL_OUTOF10: 5
PAINLEVEL_OUTOF10: 7

## 2024-03-23 ASSESSMENT — PAIN DESCRIPTION - DESCRIPTORS
DESCRIPTORS: CRAMPING
DESCRIPTORS: ACHING;CRAMPING
DESCRIPTORS: CRAMPING
DESCRIPTORS: CRAMPING

## 2024-03-23 ASSESSMENT — PAIN DESCRIPTION - ORIENTATION
ORIENTATION: MID;LEFT
ORIENTATION: MID
ORIENTATION: MID

## 2024-03-23 ASSESSMENT — PAIN - FUNCTIONAL ASSESSMENT
PAIN_FUNCTIONAL_ASSESSMENT: ACTIVITIES ARE NOT PREVENTED
PAIN_FUNCTIONAL_ASSESSMENT: NONE - DENIES PAIN

## 2024-03-23 ASSESSMENT — PAIN DESCRIPTION - LOCATION
LOCATION: ABDOMEN
LOCATION: HEAD
LOCATION: ABDOMEN
LOCATION: HEAD;NECK

## 2024-03-23 ASSESSMENT — PAIN DESCRIPTION - ONSET: ONSET: ON-GOING

## 2024-03-23 NOTE — ANESTHESIA PRE PROCEDURE
Department of Anesthesiology  Preprocedure Note       Name:  Bradford Rivera   Age:  29 y.o.  :  1995                                          MRN:  [id number]         Date:  3/23/2024      Surgeon: Surgeon(s):  Edd Marshall MD    Procedure: Procedure(s):  DILATATION AND CURETTAGE SUCTION    Medications prior to admission:   Prior to Admission medications    Medication Sig Start Date End Date Taking? Authorizing Provider   ALPRAZolam (XANAX) 0.5 MG tablet Take 1 tablet by mouth nightly as needed for Sleep.    Provider, MD Preston       Current medications:    Current Facility-Administered Medications   Medication Dose Route Frequency Provider Last Rate Last Admin   • lactated ringers IV soln infusion   IntraVENous Continuous Edd Marshall  mL/hr at 24 1051 New Bag at 24 1051   • ketorolac (TORADOL) injection 30 mg  30 mg IntraVENous Q6H PRN Edd Marshall MD   30 mg at 24 0948   • lactated ringers IV soln infusion   IntraVENous Continuous Edd Marshall  mL/hr at 24 1451 NoRateChange at 24 1451   • sodium chloride flush 0.9 % injection 5-40 mL  5-40 mL IntraVENous 2 times per day Edd Marshall MD       • sodium chloride flush 0.9 % injection 5-40 mL  5-40 mL IntraVENous PRN Edd Marshall MD       • 0.9 % sodium chloride infusion   IntraVENous PRN Edd Marshall MD       • cefOXitin (MEFOXIN) 2,000 mg in sterile water 20 mL IV syringe  2,000 mg IntraVENous On Call to OR Edd Marshall MD           Allergies:    Allergies   Allergen Reactions   • Macrobid [Nitrofurantoin Macrocrystal] Nausea And Vomiting   • Penicillins Diarrhea and Nausea And Vomiting   • Vicodin [Hydrocodone-Acetaminophen] Diarrhea and Nausea And Vomiting       Problem List:    Patient Active Problem List   Diagnosis Code   • Missed  O02.1   • Dysfunctional uterine bleeding N93.8       Past Medical History:        Diagnosis Date   • Missed

## 2024-03-23 NOTE — ED NOTES
ED to Inpatient Handoff Report    Notified nursing staff on 5W that electronic handoff available and patient ready for transport to room 0514.    Safety Risks: None identified    Patient in Restraints: no    Constant Observer or Patient : no    Telemetry Monitoring Ordered :No           Order to transfer to unit without monitor: Yes    Last MEWS:1 Time completed: 0019    Deterioration Index Score:   Predictive Model Details          14 (Normal)  Factor Value    Calculated 3/23/2024 00:20 43% Age 29 years old    Deterioration Index Model 19% Pulse oximetry 100 %     19% Sodium 142 mmol/L     13% Systolic 125     4% WBC count 8.6 k/uL     1% Temperature 97.8 °F (36.6 °C)     1% Pulse 77     0% Respiratory rate 16     0% Potassium 3.9 mmol/L     0% Hematocrit 37.3 %        Vitals:    03/22/24 1644 03/22/24 2014 03/23/24 0019   BP: (!) 143/75  125/84   Pulse: (!) 109  77   Resp: 18 16 16   Temp: 97.8 °F (36.6 °C)  97.8 °F (36.6 °C)   TempSrc:   Oral   SpO2: 100%  100%   Weight: 102.1 kg (225 lb)     Height: 1.626 m (5' 4\")           Opportunity for questions and clarification was provided.

## 2024-03-23 NOTE — PROGRESS NOTES
4 Eyes Skin Assessment     NAME:  Bradford Rivera  YOB: 1995  MEDICAL RECORD NUMBER:  [id number]    The patient is being assessed for  Admission    I agree that at least one RN has performed a thorough Head to Toe Skin Assessment on the patient. ALL assessment sites listed below have been assessed.      Areas assessed by both nurses:    Head, Face, Ears, Shoulders, Back, Chest, Arms, Elbows, Hands, Sacrum. Buttock, Coccyx, Ischium, and Legs. Feet and Heels        Does the Patient have a Wound? No noted wound(s)       Eamon Prevention initiated by RN: No  Wound Care Orders initiated by RN: No    Pressure Injury (Stage 3,4, Unstageable, DTI, NWPT, and Complex wounds) if present, place Wound referral order by RN under : No    New Ostomies, if present place, Ostomy referral order under : No     Nurse 1 eSignature: Electronically signed by Carmela Reina RN on 3/23/24 at 5:32 AM EDT    **SHARE this note so that the co-signing nurse can place an eSignature**    Nurse 2 eSignature: Electronically signed by Kandy Jack RN on 3/23/24 at 5:54 AM EDT

## 2024-03-23 NOTE — H&P
Subjective:      Patient is a 29 y.o. female       Discussed Blood/Blood Products: no    Patient Active Problem List    Diagnosis Date Noted    Dysfunctional uterine bleeding 2024    Missed  2024     Past Medical History:   Diagnosis Date    Missed        Past Surgical History:   Procedure Laterality Date    DILATION AND CURETTAGE OF UTERUS  2019    WISDOM TOOTH EXTRACTION        Medications Prior to Admission: ALPRAZolam (XANAX) 0.5 MG tablet, Take 1 tablet by mouth nightly as needed for Sleep.  Allergies   Allergen Reactions    Macrobid [Nitrofurantoin Macrocrystal] Nausea And Vomiting    Penicillins Diarrhea and Nausea And Vomiting    Vicodin [Hydrocodone-Acetaminophen] Diarrhea and Nausea And Vomiting      Social History     Tobacco Use    Smoking status: Never     Passive exposure: Never    Smokeless tobacco: Never   Substance Use Topics    Alcohol use: Not Currently      Family History   Problem Relation Age of Onset    Cancer Mother     Ovarian Cancer Maternal Grandmother         Review of Systems  Pertinent items are noted in HPI.      Objective:      BP (!) 99/53   Pulse 74   Temp 97.6 °F (36.4 °C) (Oral)   Resp 16   Ht 1.626 m (5' 4\")   Wt 102.1 kg (225 lb)   SpO2 98%   BMI 38.62 kg/m²     General:   alert, appears stated age, and cooperative   Skin:   normal   HEENT:  PERRLA   Lungs:   clear to auscultation bilaterally   Heart:   regular rate and rhythm, S1, S2 normal, no murmur, click, rub or gallop   Breasts:      Abdomen:  soft, non-tender; bowel sounds normal; no masses,  no organomegaly   Pelvis:  Exam deferred.                                               Assessment:       Incomplete         Plan:      Suction dilation and curettage      Edd Gaffney MD  3/23/2024

## 2024-03-23 NOTE — SIGNIFICANT EVENT
Radiology Procedure Waiver   Name: Bradford Rivera  : 1995  MRN: [id number]    Date:  3/22/24    Time: 8:58 PM EDT    Benefits of immediately proceeding with Radiology exam(s) without pre-testing outweigh the risks or are not indicated as specified below and therefore the following is/are being waived:    [x] Pregnancy test   [] Patients LMP on-time and regular.   [] Patient had Tubal Ligation or has other Contraception Device.   [] Patient  is Menopausal or Premenarcheal.    [] Patient had Full or Partial Hysterectomy.    [] Protocol for Iodine allergy    [] MRI Questionnaire     [] BUN/Creatinine   [] Patient age w/no hx of renal dysfunction.   [] Patient on Dialysis.   [] Recent Normal Labs.  Electronically signed by ROXANA Velásquez CNP on 3/22/24 at 8:58 PM EDT          Patient had a nonviable pregnancy with chemical .

## 2024-03-24 VITALS
HEART RATE: 98 BPM | HEIGHT: 64 IN | BODY MASS INDEX: 38.05 KG/M2 | RESPIRATION RATE: 16 BRPM | OXYGEN SATURATION: 94 % | WEIGHT: 222.88 LBS | TEMPERATURE: 98.3 F | DIASTOLIC BLOOD PRESSURE: 73 MMHG | SYSTOLIC BLOOD PRESSURE: 144 MMHG

## 2024-03-24 LAB
MICROORGANISM SPEC CULT: ABNORMAL
SPECIMEN DESCRIPTION: ABNORMAL

## 2024-03-24 PROCEDURE — 6360000002 HC RX W HCPCS: Performed by: OBSTETRICS & GYNECOLOGY

## 2024-03-24 PROCEDURE — G0378 HOSPITAL OBSERVATION PER HR: HCPCS

## 2024-03-24 PROCEDURE — 96374 THER/PROPH/DIAG INJ IV PUSH: CPT

## 2024-03-24 RX ORDER — KETOROLAC TROMETHAMINE 10 MG/1
10 TABLET, FILM COATED ORAL EVERY 6 HOURS PRN
Qty: 20 TABLET | Refills: 0 | Status: SHIPPED | OUTPATIENT
Start: 2024-03-24 | End: 2024-03-29

## 2024-03-24 RX ADMIN — KETOROLAC TROMETHAMINE 30 MG: 15 INJECTION, SOLUTION INTRAMUSCULAR; INTRAVENOUS at 08:39

## 2024-03-24 ASSESSMENT — PAIN DESCRIPTION - DESCRIPTORS: DESCRIPTORS: CRAMPING

## 2024-03-24 ASSESSMENT — PAIN DESCRIPTION - LOCATION: LOCATION: ABDOMEN

## 2024-03-24 ASSESSMENT — PAIN SCALES - GENERAL: PAINLEVEL_OUTOF10: 5

## 2024-03-25 ENCOUNTER — TELEPHONE (OUTPATIENT)
Dept: OBGYN | Age: 29
End: 2024-03-25

## 2024-03-25 NOTE — TELEPHONE ENCOUNTER
Called and spoke with patient. She just got out of the hospital yesterday. Had a D&C over the weekend and will be following up with provider who did the surgery. Called and cancelled surgery for Tuesday.

## 2024-03-27 LAB — SURGICAL PATHOLOGY REPORT: NORMAL

## 2024-03-29 NOTE — OP NOTE
Operative Note      Patient: Bradford Rivera  YOB: 1995  MRN: [id number]    Date of Procedure: 3/23/2024    Preoperative diagnosis: Incomplete     Post-Op Diagnosis: Same       Procedure(s):  DILATATION AND CURETTAGE SUCTION    Surgeon(s):  Edd Marshall MD    Assistant:   * No surgical staff found *    Anesthesia: General    Estimated Blood Loss (mL): less than 100     Complications: None    Specimens:   ID Type Source Tests Collected by Time Destination   A : PRODUCTS OF CONCEPTION Tissue Tissue SURGICAL PATHOLOGY Edd Marshall MD 3/23/2024 1719        Implants:  * No implants in log *      Drains: * No LDAs found *    Findings: Uterus with retained products of conception      Detailed Description of Procedure:     Patient was brought to the operating placed in dorsal supine position.  General anesthesia with laryngeal mask airway was secured.  Patient was then placed in dorsolithotomy position and prepped and draped in usual sterile fashion.  Cervix was held with single-tooth tenaculum easily dilated to admit 8 mm suction curette.  Suction curettage was then performed to the uterus was deemed empty.  Upon completion no bleeding was noted.  Tenaculum was then removed and was hemostatic patient was then placed in dorsal supine position woke from general esthesia and transferred recovery in stable    Electronically signed by Edd Marshall MD on 3/29/2024 at 12:57 PM

## 2024-06-28 DIAGNOSIS — O20.0 THREATENED ABORTION: Primary | ICD-10-CM

## 2024-06-28 DIAGNOSIS — O03.9 COMPLETE ABORTION: ICD-10-CM

## 2024-06-28 LAB — HCG QUANTITATIVE: 118.6 MIU/ML (ref 0–7)

## 2024-07-02 ENCOUNTER — TELEPHONE (OUTPATIENT)
Dept: OBGYN | Age: 29
End: 2024-07-02

## 2024-07-02 NOTE — TELEPHONE ENCOUNTER
----- Message from Lu Ayala DO sent at 7/2/2024  8:54 AM EDT -----  Please let her know that her hCG levels are falling.  But I see she is following up with Dr. Hernandez so he can proceed from here.  I would recommend repeating the hCG level

## 2025-01-27 ENCOUNTER — HOSPITAL ENCOUNTER (OUTPATIENT)
Age: 30
Setting detail: OBSERVATION
Discharge: HOME | End: 2025-01-27
Attending: OBSTETRICS & GYNECOLOGY | Admitting: OBSTETRICS & GYNECOLOGY
Payer: MEDICAID

## 2025-01-27 VITALS
SYSTOLIC BLOOD PRESSURE: 106 MMHG | TEMPERATURE: 97.7 F | RESPIRATION RATE: 16 BRPM | DIASTOLIC BLOOD PRESSURE: 63 MMHG | HEART RATE: 72 BPM

## 2025-01-27 PROBLEM — O26.893 PREGNANCY HEADACHE IN THIRD TRIMESTER: Status: ACTIVE | Noted: 2025-01-27

## 2025-01-27 PROBLEM — Z3A.26 26 WEEKS GESTATION OF PREGNANCY: Status: ACTIVE | Noted: 2025-01-27

## 2025-01-27 PROBLEM — O12.03 SWELLING OF LOWER EXTREMITY DURING PREGNANCY IN THIRD TRIMESTER: Status: ACTIVE | Noted: 2025-01-27

## 2025-01-27 PROBLEM — O36.8130 DECREASED FETAL MOVEMENTS IN THIRD TRIMESTER: Status: ACTIVE | Noted: 2025-01-27

## 2025-01-27 PROBLEM — R51.9 PREGNANCY HEADACHE IN THIRD TRIMESTER: Status: ACTIVE | Noted: 2025-01-27

## 2025-01-27 LAB
ALBUMIN SERPL-MCNC: 3.4 G/DL (ref 3.5–5.2)
ALP SERPL-CCNC: 69 U/L (ref 35–104)
ALT SERPL-CCNC: 19 U/L (ref 0–32)
ANION GAP SERPL CALCULATED.3IONS-SCNC: 11 MMOL/L (ref 7–16)
AST SERPL-CCNC: 21 U/L (ref 0–31)
BILIRUB SERPL-MCNC: <0.2 MG/DL (ref 0–1.2)
BILIRUB UR QL STRIP: NEGATIVE
BUN SERPL-MCNC: 11 MG/DL (ref 6–20)
CALCIUM SERPL-MCNC: 8.7 MG/DL (ref 8.6–10.2)
CHLORIDE SERPL-SCNC: 106 MMOL/L (ref 98–107)
CLARITY UR: CLEAR
CO2 SERPL-SCNC: 22 MMOL/L (ref 22–29)
COLOR UR: YELLOW
CREAT SERPL-MCNC: 0.6 MG/DL (ref 0.5–1)
CREAT UR-MCNC: 157.9 MG/DL (ref 29–226)
ERYTHROCYTE [DISTWIDTH] IN BLOOD BY AUTOMATED COUNT: 12.3 % (ref 11.5–15)
GFR, ESTIMATED: >90 ML/MIN/1.73M2
GLUCOSE SERPL-MCNC: 117 MG/DL (ref 74–99)
GLUCOSE UR STRIP-MCNC: NEGATIVE MG/DL
HCT VFR BLD AUTO: 34.8 % (ref 34–48)
HGB BLD-MCNC: 11.6 G/DL (ref 11.5–15.5)
HGB UR QL STRIP.AUTO: NEGATIVE
KETONES UR STRIP-MCNC: NEGATIVE MG/DL
LEUKOCYTE ESTERASE UR QL STRIP: NEGATIVE
MCH RBC QN AUTO: 31.4 PG (ref 26–35)
MCHC RBC AUTO-ENTMCNC: 33.3 G/DL (ref 32–34.5)
MCV RBC AUTO: 94.3 FL (ref 80–99.9)
NITRITE UR QL STRIP: NEGATIVE
PH UR STRIP: 6 [PH] (ref 5–9)
PLATELET # BLD AUTO: 152 K/UL (ref 130–450)
PMV BLD AUTO: 10.6 FL (ref 7–12)
POTASSIUM SERPL-SCNC: 4.3 MMOL/L (ref 3.5–5)
PROT SERPL-MCNC: 6.4 G/DL (ref 6.4–8.3)
PROT UR STRIP-MCNC: NEGATIVE MG/DL
RBC # BLD AUTO: 3.69 M/UL (ref 3.5–5.5)
RBC #/AREA URNS HPF: NORMAL /HPF
SODIUM SERPL-SCNC: 139 MMOL/L (ref 132–146)
SP GR UR STRIP: 1.02 (ref 1–1.03)
TOTAL PROTEIN, URINE: 11 MG/DL (ref 0–12)
URATE SERPL-MCNC: 3.6 MG/DL (ref 2.4–5.7)
URINE TOTAL PROTEIN CREATININE RATIO: 0.07 (ref 0–0.2)
UROBILINOGEN UR STRIP-ACNC: 0.2 EU/DL (ref 0–1)
WBC #/AREA URNS HPF: NORMAL /HPF
WBC OTHER # BLD: 9.3 K/UL (ref 4.5–11.5)

## 2025-01-27 PROCEDURE — 99213 OFFICE O/P EST LOW 20 MIN: CPT

## 2025-01-27 PROCEDURE — 84550 ASSAY OF BLOOD/URIC ACID: CPT

## 2025-01-27 PROCEDURE — 80053 COMPREHEN METABOLIC PANEL: CPT

## 2025-01-27 PROCEDURE — 82570 ASSAY OF URINE CREATININE: CPT

## 2025-01-27 PROCEDURE — G0378 HOSPITAL OBSERVATION PER HR: HCPCS

## 2025-01-27 PROCEDURE — 85027 COMPLETE CBC AUTOMATED: CPT

## 2025-01-27 PROCEDURE — 99221 1ST HOSP IP/OBS SF/LOW 40: CPT | Performed by: PHYSICIAN ASSISTANT

## 2025-01-27 PROCEDURE — 81001 URINALYSIS AUTO W/SCOPE: CPT

## 2025-01-27 PROCEDURE — 84156 ASSAY OF PROTEIN URINE: CPT

## 2025-01-27 NOTE — H&P
Department of Obstetrics and Gynecology  Labor and Delivery   Physician Assistant Obstetrics History and Physical      Patient Name: Bradford Rivera  YOB: 1995   MRN: [id number]    CHIEF COMPLAINT:  decreased fetal movement, headache, swelling    HISTORY OF PRESENT ILLNESS:    The patient is a 29 y.o. female,  A2, Estimated Date of Delivery: 25, EGA: 26 and 5/7 weeks presents to L&D Antepartum from home for c/o decreased fetal movement since yesterday, fetal activity has increased since place on monitor. Patient c/o headache since last night, swelling and intermittent blurred vision x 2 weeks. Took Tylenol at 04:30 and 11:30 which has helped. She denies leakage of fluid, vaginal bleeding, contractions, cramping, RUQ or epigastric pain,or urinary symptoms.  Perceived fetal movement is good.     Her current obstetrical history is significant for:   History of gestational hypertension-taking low dose aspirin  Maternal varicella, non-immune   History of miscarriage - 3 previous (2 in  1 in )   Anxiety     PAST OB HISTORY  OB History    Para Term  AB Living   5 1 1   2 1   SAB IAB Ectopic Molar Multiple Live Births   1       0 1      # Outcome Date GA Lbr Aaron/2nd Weight Sex Type Anes PTL Lv   5 Current            4 Term 20 39w0d / 00:57 3.21 kg (7 lb 1.2 oz) M Vag-Spont EPI, Local N TYE   3 SAB 2019           2             1 AB                Past Medical History:    Diagnosis Date    Missed          Past Surgical History:    Procedure Laterality Date    DILATION AND CURETTAGE OF UTERUS  2019    DILATION AND CURETTAGE OF UTERUS N/A 3/23/2024    DILATATION AND CURETTAGE SUCTION performed by Edd Marshall MD at [facility] OR    WISDOM TOOTH EXTRACTION          Medications Prior to Admission:  Medications Prior to Admission: cetirizine (ZYRTEC) 10 MG tablet, Take 1 tablet by mouth daily  Prenatal Vit-Fe Fumarate-FA (PRENATAL VITAMINS) 28-0.8 MG TABS,

## 2025-01-27 NOTE — PROGRESS NOTES
26w5d presents to antepartum with complaints of decreased fetal movement since yesterday, feeling movement now on that on monitors.   Pt also complaints of headache since last night, and swelling in legs and arms + blurred vision for 2 weeks, pt expresses concerns of preeclampsia and anxiety. Pt states she took tylenol around 0430 and 1130.     Placed on EFM, blood pressures cycling.

## 2025-02-26 ENCOUNTER — HOSPITAL ENCOUNTER (OUTPATIENT)
Age: 30
Discharge: HOME | End: 2025-02-26
Attending: OBSTETRICS & GYNECOLOGY | Admitting: OBSTETRICS & GYNECOLOGY
Payer: MEDICAID

## 2025-02-26 VITALS
RESPIRATION RATE: 18 BRPM | DIASTOLIC BLOOD PRESSURE: 58 MMHG | TEMPERATURE: 98.2 F | SYSTOLIC BLOOD PRESSURE: 115 MMHG | HEART RATE: 75 BPM
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
## 2025-02-26 LAB
AMNISURE, POC: NEGATIVE
Lab: NORMAL
NEGATIVE QC PASS/FAIL: NORMAL
POSITIVE QC PASS/FAIL: NORMAL

## 2025-02-26 PROCEDURE — 59025 FETAL NON-STRESS TEST: CPT

## 2025-02-26 PROCEDURE — 99203 OFFICE O/P NEW LOW 30 MIN: CPT

## 2025-02-26 PROCEDURE — 84112 EVAL AMNIOTIC FLUID PROTEIN: CPT

## 2025-02-27 NOTE — PROGRESS NOTES
NON STRESS TEST :    RE:  Bradford Rivera   : 1995   AGE: 29 y.o.    GESTATIONAL AGE:  31w0d    DIAGNOSIS:   Fall on back    Patient Active Problem List    Diagnosis Date Noted    Fall (on) (from) other stairs and steps, initial encounter 2025    26 weeks gestation of pregnancy 2025    Decreased fetal movements in third trimester 2025    Pregnancy headache in third trimester 2025    Swelling of lower extremity during pregnancy in third trimester 2025       NON STRESS TEST INTERPRETATION: from 1995-8126    RESULT:   FHR Baseline Rate:   125 bpm     PERIODIC CHANGES:    Accelerations present, variability moderate, no decelerations noted    REACTIVE NST      ROXANA Cox CNM

## 2025-02-27 NOTE — H&P
Department of Obstetrics and Gynecology  Labor and Delivery  History & Physical    Patient:  Bradford Rivera     Admit Date:  2025  8:43 PM  Medical Record Number:  [id number]    CHIEF COMPLAINT:  fall    PROBLEM LIST:     Patient Active Problem List   Diagnosis    26 weeks gestation of pregnancy    Decreased fetal movements in third trimester    Pregnancy headache in third trimester    Swelling of lower extremity during pregnancy in third trimester    Fall (on) (from) other stairs and steps, initial encounter           HISTORY OF PRESENT ILLNESS:    The patient is a 29 y.o. female  at 31w0d. Patient presents with a chief complaint as above and is being evaluated for falling down 4 steps outside slipped in the rain, patient hit her back and hands, did not hit the abdomen. +FM. Denies any ctxs just her normal pregnancy pains. States she peed herself when she fell. Has a mild headache but tired, denies any visual changes.    ESTIMATED DUE DATE: Estimated Date of Delivery: 25    PRENATAL CARE:  Complicated by: Hx GHTN, large for dates, Hx miscarriage x3  GBS: not done    Past OB History  OB History          5    Para   1    Term   1            AB   2    Living   1         SAB   1    IAB        Ectopic        Molar        Multiple   0    Live Births   1                Past Medical History:        Diagnosis Date    Missed         Past Surgical History:        Procedure Laterality Date    DILATION AND CURETTAGE OF UTERUS  2019    DILATION AND CURETTAGE OF UTERUS N/A 3/23/2024    DILATATION AND CURETTAGE SUCTION performed by Edd Marshall MD at [facility] OR    WISDOM TOOTH EXTRACTION         Allergies:  Macrobid [nitrofurantoin macrocrystal], Penicillins, and Vicodin [hydrocodone-acetaminophen]    Social History:    Social History     Socioeconomic History    Marital status: Single     Spouse name: Not on file    Number of children: Not on file    Years of education: Not on file

## 2025-02-27 NOTE — PROGRESS NOTES
Pt is  a  31w0d presenting with c/o falling down 4 stairs. Pt denies LOF, VB, CTX. +FM. Pt denies any complications with this pregnancy. Pt placed on EFM. Call light within reach.

## 2025-03-19 RX ORDER — CEPHALEXIN 500 MG/1
500 CAPSULE ORAL 3 TIMES DAILY
Qty: 30 CAPSULE | Refills: 0 | Status: SHIPPED | OUTPATIENT
Start: 2025-03-19 | End: 2025-03-29

## 2025-04-10 PROBLEM — R51.9 HEADACHE IN PREGNANCY, ANTEPARTUM, THIRD TRIMESTER: Status: ACTIVE | Noted: 2025-04-10

## 2025-04-10 PROBLEM — O99.213 OBESITY AFFECTING PREGNANCY IN THIRD TRIMESTER: Status: ACTIVE | Noted: 2025-04-10

## 2025-04-10 PROBLEM — Z87.59 HISTORY OF GESTATIONAL HYPERTENSION: Status: ACTIVE | Noted: 2025-04-10

## 2025-04-10 PROBLEM — O26.893 HEADACHE IN PREGNANCY, ANTEPARTUM, THIRD TRIMESTER: Status: ACTIVE | Noted: 2025-04-10

## 2025-04-10 PROBLEM — Z87.59 HISTORY OF MISCARRIAGE: Status: ACTIVE | Noted: 2025-04-10

## 2025-04-10 PROBLEM — R82.71 GROUP B STREPTOCOCCAL BACTERIURIA: Status: ACTIVE | Noted: 2025-04-10

## 2025-04-10 PROBLEM — Z34.81 MULTIGRAVIDA IN FIRST TRIMESTER: Status: ACTIVE | Noted: 2025-04-10

## 2025-04-10 PROBLEM — O09.293 PREGNANCY IN THIRD TRIMESTER WITH HISTORY OF ABORTION: Status: ACTIVE | Noted: 2025-04-10

## 2025-04-17 PROBLEM — B37.31 YEAST VAGINITIS: Status: ACTIVE | Noted: 2025-04-17

## 2025-04-17 PROBLEM — O36.8130 DECREASED FETAL MOVEMENTS IN THIRD TRIMESTER: Status: RESOLVED | Noted: 2025-01-27 | Resolved: 2025-04-17

## 2025-04-24 ENCOUNTER — HOSPITAL ENCOUNTER (INPATIENT)
Age: 30
LOS: 2 days | Discharge: HOME | End: 2025-04-26
Attending: OBSTETRICS & GYNECOLOGY | Admitting: NURSE PRACTITIONER
Payer: MEDICAID

## 2025-04-24 ENCOUNTER — ANESTHESIA (OUTPATIENT)
Dept: LABOR AND DELIVERY | Age: 30
End: 2025-04-24
Payer: MEDICAID

## 2025-04-24 ENCOUNTER — ANESTHESIA EVENT (OUTPATIENT)
Dept: LABOR AND DELIVERY | Age: 30
End: 2025-04-24
Payer: MEDICAID

## 2025-04-24 PROBLEM — Z37.9 NORMAL LABOR: Status: ACTIVE | Noted: 2025-04-24

## 2025-04-24 PROBLEM — Z28.39 MATERNAL VARICELLA, NON-IMMUNE: Status: ACTIVE | Noted: 2025-04-24

## 2025-04-24 PROBLEM — B37.31 YEAST VAGINITIS: Status: RESOLVED | Noted: 2025-04-17 | Resolved: 2025-04-24
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
## 2025-04-24 PROBLEM — O12.03 SWELLING OF LOWER EXTREMITY DURING PREGNANCY IN THIRD TRIMESTER: Status: RESOLVED | Noted: 2025-01-27 | Resolved: 2025-04-24

## 2025-04-24 PROBLEM — O09.899 MATERNAL VARICELLA, NON-IMMUNE: Status: ACTIVE | Noted: 2025-04-24

## 2025-04-24 PROBLEM — Z3A.39 39 WEEKS GESTATION OF PREGNANCY: Status: ACTIVE | Noted: 2025-04-24

## 2025-04-24 PROBLEM — Z3A.26 26 WEEKS GESTATION OF PREGNANCY: Status: RESOLVED | Noted: 2025-01-27 | Resolved: 2025-04-24

## 2025-04-24 LAB
ABO + RH BLD: NORMAL
AMPHET UR QL SCN: NEGATIVE
ARM BAND NUMBER: NORMAL
BARBITURATES UR QL SCN: NEGATIVE
BASOPHILS # BLD: 0.02 K/UL (ref 0–0.2)
BASOPHILS NFR BLD: 0 % (ref 0–2)
BENZODIAZ UR QL: NEGATIVE
BLOOD BANK SAMPLE EXPIRATION: NORMAL
BLOOD GROUP ANTIBODIES SERPL: NEGATIVE
BUPRENORPHINE UR QL: NEGATIVE
CANNABINOIDS UR QL SCN: NEGATIVE
COCAINE UR QL SCN: NEGATIVE
EOSINOPHIL # BLD: 0.05 K/UL (ref 0.05–0.5)
EOSINOPHILS RELATIVE PERCENT: 0 % (ref 0–6)
ERYTHROCYTE [DISTWIDTH] IN BLOOD BY AUTOMATED COUNT: 12.6 % (ref 11.5–15)
FENTANYL UR QL: NEGATIVE
HCT VFR BLD AUTO: 34.8 % (ref 34–48)
HGB BLD-MCNC: 12.1 G/DL (ref 11.5–15.5)
IMM GRANULOCYTES # BLD AUTO: 0.12 K/UL (ref 0–0.58)
IMM GRANULOCYTES NFR BLD: 1 % (ref 0–5)
LYMPHOCYTES NFR BLD: 2.07 K/UL (ref 1.5–4)
LYMPHOCYTES RELATIVE PERCENT: 18 % (ref 20–42)
MCH RBC QN AUTO: 31.8 PG (ref 26–35)
MCHC RBC AUTO-ENTMCNC: 34.8 G/DL (ref 32–34.5)
MCV RBC AUTO: 91.6 FL (ref 80–99.9)
METHADONE UR QL: NEGATIVE
MONOCYTES NFR BLD: 0.6 K/UL (ref 0.1–0.95)
MONOCYTES NFR BLD: 5 % (ref 2–12)
NEUTROPHILS NFR BLD: 76 % (ref 43–80)
NEUTS SEG NFR BLD: 8.85 K/UL (ref 1.8–7.3)
OPIATES UR QL SCN: NEGATIVE
OXYCODONE UR QL SCN: NEGATIVE
PCP UR QL SCN: NEGATIVE
PLATELET # BLD AUTO: 156 K/UL (ref 130–450)
PMV BLD AUTO: 11.8 FL (ref 7–12)
RBC # BLD AUTO: 3.8 M/UL (ref 3.5–5.5)
TEST INFORMATION: NORMAL
WBC OTHER # BLD: 11.7 K/UL (ref 4.5–11.5)

## 2025-04-24 PROCEDURE — 6360000002 HC RX W HCPCS: Performed by: ANESTHESIOLOGY

## 2025-04-24 PROCEDURE — 51701 INSERT BLADDER CATHETER: CPT

## 2025-04-24 PROCEDURE — 86901 BLOOD TYPING SEROLOGIC RH(D): CPT

## 2025-04-24 PROCEDURE — 86850 RBC ANTIBODY SCREEN: CPT

## 2025-04-24 PROCEDURE — 85025 COMPLETE CBC W/AUTO DIFF WBC: CPT

## 2025-04-24 PROCEDURE — 6370000000 HC RX 637 (ALT 250 FOR IP): Performed by: OBSTETRICS & GYNECOLOGY

## 2025-04-24 PROCEDURE — 80307 DRUG TEST PRSMV CHEM ANLYZR: CPT

## 2025-04-24 PROCEDURE — 6360000002 HC RX W HCPCS: Performed by: OBSTETRICS & GYNECOLOGY

## 2025-04-24 PROCEDURE — 86900 BLOOD TYPING SEROLOGIC ABO: CPT

## 2025-04-24 PROCEDURE — 2500000003 HC RX 250 WO HCPCS: Performed by: ANESTHESIOLOGY

## 2025-04-24 PROCEDURE — 6360000002 HC RX W HCPCS: Performed by: NURSE PRACTITIONER

## 2025-04-24 PROCEDURE — 2580000003 HC RX 258: Performed by: NURSE PRACTITIONER

## 2025-04-24 PROCEDURE — 7200000001 HC VAGINAL DELIVERY

## 2025-04-24 PROCEDURE — 1220000001 HC SEMI PRIVATE L&D R&B

## 2025-04-24 PROCEDURE — 10907ZC DRAINAGE OF AMNIOTIC FLUID, THERAPEUTIC FROM PRODUCTS OF CONCEPTION, VIA NATURAL OR ARTIFICIAL OPENING: ICD-10-PCS | Performed by: OBSTETRICS & GYNECOLOGY

## 2025-04-24 PROCEDURE — APPNB30 APP NON BILLABLE TIME 0-30 MINS: Performed by: NURSE PRACTITIONER

## 2025-04-24 PROCEDURE — 6360000002 HC RX W HCPCS: Performed by: NURSE ANESTHETIST, CERTIFIED REGISTERED

## 2025-04-24 RX ORDER — TRANEXAMIC ACID 10 MG/ML
1000 INJECTION, SOLUTION INTRAVENOUS
Status: DISCONTINUED | OUTPATIENT
Start: 2025-04-24 | End: 2025-04-26 | Stop reason: HOSPADM

## 2025-04-24 RX ORDER — SODIUM CHLORIDE, SODIUM LACTATE, POTASSIUM CHLORIDE, AND CALCIUM CHLORIDE .6; .31; .03; .02 G/100ML; G/100ML; G/100ML; G/100ML
500 INJECTION, SOLUTION INTRAVENOUS PRN
Status: DISCONTINUED | OUTPATIENT
Start: 2025-04-24 | End: 2025-04-26 | Stop reason: HOSPADM

## 2025-04-24 RX ORDER — IBUPROFEN 600 MG/1
600 TABLET, FILM COATED ORAL EVERY 6 HOURS PRN
Status: DISCONTINUED | OUTPATIENT
Start: 2025-04-24 | End: 2025-04-26 | Stop reason: HOSPADM

## 2025-04-24 RX ORDER — ONDANSETRON 4 MG/1
4 TABLET, ORALLY DISINTEGRATING ORAL EVERY 6 HOURS PRN
Status: CANCELLED | OUTPATIENT
Start: 2025-04-24

## 2025-04-24 RX ORDER — ACETAMINOPHEN 650 MG
TABLET, EXTENDED RELEASE ORAL
Status: DISPENSED
Start: 2025-04-24 | End: 2025-04-24

## 2025-04-24 RX ORDER — ONDANSETRON 2 MG/ML
4 INJECTION INTRAMUSCULAR; INTRAVENOUS EVERY 6 HOURS PRN
Status: CANCELLED | OUTPATIENT
Start: 2025-04-24

## 2025-04-24 RX ORDER — DIPHENHYDRAMINE HYDROCHLORIDE 50 MG/ML
25 INJECTION, SOLUTION INTRAMUSCULAR; INTRAVENOUS ONCE
Status: COMPLETED | OUTPATIENT
Start: 2025-04-24 | End: 2025-04-24

## 2025-04-24 RX ORDER — LIDOCAINE HYDROCHLORIDE 10 MG/ML
INJECTION, SOLUTION INFILTRATION; PERINEURAL
Status: DISPENSED
Start: 2025-04-24 | End: 2025-04-24

## 2025-04-24 RX ORDER — DOCUSATE SODIUM 100 MG/1
100 CAPSULE, LIQUID FILLED ORAL 2 TIMES DAILY
Status: DISCONTINUED | OUTPATIENT
Start: 2025-04-24 | End: 2025-04-26 | Stop reason: HOSPADM

## 2025-04-24 RX ORDER — ACETAMINOPHEN 325 MG/1
650 TABLET ORAL EVERY 6 HOURS PRN
Status: DISCONTINUED | OUTPATIENT
Start: 2025-04-24 | End: 2025-04-26 | Stop reason: HOSPADM

## 2025-04-24 RX ORDER — CARBOPROST TROMETHAMINE 250 UG/ML
250 INJECTION, SOLUTION INTRAMUSCULAR PRN
Status: DISCONTINUED | OUTPATIENT
Start: 2025-04-24 | End: 2025-04-26 | Stop reason: HOSPADM

## 2025-04-24 RX ORDER — ONDANSETRON 2 MG/ML
4 INJECTION INTRAMUSCULAR; INTRAVENOUS EVERY 6 HOURS PRN
Status: DISCONTINUED | OUTPATIENT
Start: 2025-04-24 | End: 2025-04-26 | Stop reason: HOSPADM

## 2025-04-24 RX ORDER — PENICILLIN G 3000000 [IU]/50ML
3 INJECTION, SOLUTION INTRAVENOUS EVERY 4 HOURS
Status: DISCONTINUED | OUTPATIENT
Start: 2025-04-24 | End: 2025-04-26 | Stop reason: HOSPADM

## 2025-04-24 RX ORDER — METHYLERGONOVINE MALEATE 0.2 MG/ML
200 INJECTION INTRAVENOUS PRN
Status: DISCONTINUED | OUTPATIENT
Start: 2025-04-24 | End: 2025-04-26 | Stop reason: HOSPADM

## 2025-04-24 RX ORDER — ONDANSETRON 4 MG/1
4 TABLET, ORALLY DISINTEGRATING ORAL EVERY 8 HOURS PRN
Status: DISCONTINUED | OUTPATIENT
Start: 2025-04-24 | End: 2025-04-24 | Stop reason: ALTCHOICE

## 2025-04-24 RX ORDER — PRENATAL WITH FERROUS FUM AND FOLIC ACID 3080; 920; 120; 400; 22; 1.84; 3; 20; 10; 1; 12; 200; 27; 25; 2 [IU]/1; [IU]/1; MG/1; [IU]/1; MG/1; MG/1; MG/1; MG/1; MG/1; MG/1; UG/1; MG/1; MG/1; MG/1; MG/1
1 TABLET ORAL
Status: DISCONTINUED | OUTPATIENT
Start: 2025-04-25 | End: 2025-04-26 | Stop reason: HOSPADM

## 2025-04-24 RX ORDER — ACETAMINOPHEN 500 MG
1000 TABLET ORAL EVERY 6 HOURS PRN
Status: DISCONTINUED | OUTPATIENT
Start: 2025-04-24 | End: 2025-04-26 | Stop reason: HOSPADM

## 2025-04-24 RX ORDER — NALOXONE HYDROCHLORIDE 0.4 MG/ML
INJECTION, SOLUTION INTRAMUSCULAR; INTRAVENOUS; SUBCUTANEOUS PRN
Status: DISCONTINUED | OUTPATIENT
Start: 2025-04-24 | End: 2025-04-26 | Stop reason: HOSPADM

## 2025-04-24 RX ORDER — ONDANSETRON 2 MG/ML
4 INJECTION INTRAMUSCULAR; INTRAVENOUS EVERY 6 HOURS PRN
Status: DISCONTINUED | OUTPATIENT
Start: 2025-04-24 | End: 2025-04-24 | Stop reason: ALTCHOICE

## 2025-04-24 RX ORDER — MODIFIED LANOLIN
OINTMENT (GRAM) TOPICAL PRN
Status: DISCONTINUED | OUTPATIENT
Start: 2025-04-24 | End: 2025-04-26 | Stop reason: HOSPADM

## 2025-04-24 RX ORDER — LIDOCAINE HYDROCHLORIDE AND EPINEPHRINE 15; 5 MG/ML; UG/ML
INJECTION, SOLUTION EPIDURAL
Status: DISCONTINUED | OUTPATIENT
Start: 2025-04-24 | End: 2025-04-24 | Stop reason: SDUPTHER

## 2025-04-24 RX ORDER — BUTORPHANOL TARTRATE 2 MG/ML
1 INJECTION, SOLUTION INTRAMUSCULAR; INTRAVENOUS
Status: DISCONTINUED | OUTPATIENT
Start: 2025-04-24 | End: 2025-04-26 | Stop reason: HOSPADM

## 2025-04-24 RX ORDER — TERBUTALINE SULFATE 1 MG/ML
0.25 INJECTION SUBCUTANEOUS
Status: DISCONTINUED | OUTPATIENT
Start: 2025-04-24 | End: 2025-04-26 | Stop reason: HOSPADM

## 2025-04-24 RX ORDER — SODIUM CHLORIDE, SODIUM LACTATE, POTASSIUM CHLORIDE, CALCIUM CHLORIDE 600; 310; 30; 20 MG/100ML; MG/100ML; MG/100ML; MG/100ML
INJECTION, SOLUTION INTRAVENOUS CONTINUOUS
Status: DISCONTINUED | OUTPATIENT
Start: 2025-04-24 | End: 2025-04-26 | Stop reason: HOSPADM

## 2025-04-24 RX ORDER — MISOPROSTOL 200 UG/1
400 TABLET ORAL PRN
Status: DISCONTINUED | OUTPATIENT
Start: 2025-04-24 | End: 2025-04-26 | Stop reason: HOSPADM

## 2025-04-24 RX ADMIN — LIDOCAINE HYDROCHLORIDE,EPINEPHRINE BITARTRATE 1 ML: 15; .005 INJECTION, SOLUTION EPIDURAL; INFILTRATION; INTRACAUDAL; PERINEURAL at 14:05

## 2025-04-24 RX ADMIN — ACETAMINOPHEN 650 MG: 325 TABLET ORAL at 12:41

## 2025-04-24 RX ADMIN — OXYTOCIN 1 MILLI-UNITS/MIN: 10 INJECTION, SOLUTION INTRAMUSCULAR; INTRAVENOUS at 17:31

## 2025-04-24 RX ADMIN — BUTORPHANOL TARTRATE 1 MG: 2 INJECTION, SOLUTION INTRAMUSCULAR; INTRAVENOUS at 11:13

## 2025-04-24 RX ADMIN — Medication 15 ML/HR: at 14:14

## 2025-04-24 RX ADMIN — DIPHENHYDRAMINE HYDROCHLORIDE 25 MG: 50 INJECTION INTRAMUSCULAR; INTRAVENOUS at 15:09

## 2025-04-24 RX ADMIN — DEXTROSE 5 MILLION UNITS: 50 INJECTION, SOLUTION INTRAVENOUS at 06:37

## 2025-04-24 RX ADMIN — DIPHENHYDRAMINE HYDROCHLORIDE 25 MG: 50 INJECTION INTRAMUSCULAR; INTRAVENOUS at 20:18

## 2025-04-24 RX ADMIN — ACETAMINOPHEN 650 MG: 325 TABLET ORAL at 06:37

## 2025-04-24 RX ADMIN — BUTORPHANOL TARTRATE 1 MG: 2 INJECTION, SOLUTION INTRAMUSCULAR; INTRAVENOUS at 07:54

## 2025-04-24 RX ADMIN — ACETAMINOPHEN 650 MG: 325 TABLET ORAL at 18:54

## 2025-04-24 RX ADMIN — DIPHENHYDRAMINE HYDROCHLORIDE 25 MG: 50 INJECTION INTRAMUSCULAR; INTRAVENOUS at 13:32

## 2025-04-24 RX ADMIN — PENICILLIN G 3 MILLION UNITS: 3000000 INJECTION, SOLUTION INTRAVENOUS at 10:41

## 2025-04-24 RX ADMIN — ONDANSETRON 4 MG: 2 INJECTION, SOLUTION INTRAMUSCULAR; INTRAVENOUS at 14:19

## 2025-04-24 RX ADMIN — Medication 166.7 ML: at 22:18

## 2025-04-24 RX ADMIN — IBUPROFEN 600 MG: 600 TABLET, FILM COATED ORAL at 23:34

## 2025-04-24 RX ADMIN — PENICILLIN G 3 MILLION UNITS: 3000000 INJECTION, SOLUTION INTRAVENOUS at 16:21

## 2025-04-24 RX ADMIN — ONDANSETRON 4 MG: 2 INJECTION INTRAMUSCULAR; INTRAVENOUS at 06:45

## 2025-04-24 ASSESSMENT — PAIN SCALES - GENERAL
PAINLEVEL_OUTOF10: 4
PAINLEVEL_OUTOF10: 9
PAINLEVEL_OUTOF10: 9
PAINLEVEL_OUTOF10: 5
PAINLEVEL_OUTOF10: 8

## 2025-04-24 ASSESSMENT — PAIN DESCRIPTION - LOCATION
LOCATION: ABDOMEN
LOCATION: ABDOMEN;PERINEUM;VAGINA
LOCATION: ABDOMEN
LOCATION: HEAD
LOCATION: ABDOMEN

## 2025-04-24 ASSESSMENT — PAIN DESCRIPTION - DESCRIPTORS: DESCRIPTORS: CRAMPING;DISCOMFORT

## 2025-04-24 NOTE — ANESTHESIA PROCEDURE NOTES
CSE Block    Patient location during procedure: OB  Reason for block: procedure for pain  Staffing  Performed: resident/CRNA   Anesthesiologist: Bridgett Mcleod MD  Resident/CRNA: Marjorie El APRN - CRNA  Performed by: Marjorie El APRN - CRNA  Authorized by: Bridgett Mcleod MD    CSE  Patient position: sitting  Prep: ChloraPrep  Patient monitoring: continuous pulse ox and frequent blood pressure checks  Approach: midline  Provider prep: mask and sterile gloves  Spinal Needle  Needle type: pencil-tip   Needle gauge: 27 G  Needle length: 5 inch.  Epidural Needle  Injection technique: JOAO air  Needle type: Tuohy   Needle gauge: 18 G  Needle length: 3.5 in  Needle insertion depth: 8 cm  Location: lumbar (1-5)  Catheter  Catheter type: end hole  Catheter size: 20g.  Catheter at skin depth: 15 cm  Test dose: negative  Assessment  Hemodynamics: stable  Preanesthetic Checklist  Completed: patient identified, IV checked, site marked, risks and benefits discussed, surgical/procedural consents, equipment checked, pre-op evaluation, timeout performed, anesthesia consent given, oxygen available and monitors applied/VS acknowledged

## 2025-04-24 NOTE — PROGRESS NOTES
L&D PROGRESS NOTE:    Patient:  Bradford Rivera     Admit Date:  2025  5:33 AM  Medical Record Number:  [id number]   Today's Date: 2025    S: Edd Lima MD requesting AROM.    O:   Vitals:    25 0816 25 0846 25 0917 25 0946   BP: 117/85 111/65 112/60 123/63   Pulse: 92 85 70 84   Resp:       Temp:       TempSrc:       SpO2:         FHR:  Reassuring.  Cervix: 5 cm / 100% / soft / 0.  TOCO:  2 minutes-4 minutes.    A:30 y.o. W female at 39w1d   Spontaneous onset labor in a multigravida  GBS bacteria positive culture s/p penicillin G x 2 doses  Obesity, pregravid BMI 36.44  Pregnancy headaches  History of gestational hypertension prior pregnancy, taking LDA  History of pregnancy headache and lower swelling  History of spontaneous  x 3  Maternal varicella nonimmune  Patient Active Problem List   Diagnosis    Pregnancy headache in third trimester    Multigravida in third trimester    Group B streptococcal bacteriuria-positive culture 3/17/25.    Adult BMI 36.0-36.9 kg/sq m (pregravid BMI 36.44)    Obesity affecting pregnancy in third trimester    History of gestational hypertension-taking LDA    Headache in pregnancy, antepartum, third trimester (gets frequent HA , no ither s/s  PIH- -BP WNL - no meds/tylenol taken)    History of miscarriage - 3 previous (2 in  1 in  G2, G4 and G5)    Pregnancy in third trimester with history of  (G1 eAB; G2,G4,G5 sAb)    Normal labor    Maternal varicella, non-immune    39 1/7 weeks gestation of pregnancy     Fetal status: Reassuring  GBS: positive urine culture    P: AROM  at 1107 without difficulty using the [place]. Ruptured small amount clear fluid.  IV bolus/O2/position changes prn  See orders per Edd Aggarwal MD.    Edd Hernandez MD, M.D. FACOG  2025 11:15 AM

## 2025-04-24 NOTE — PROGRESS NOTES
Dr. Hernandez at bedside. Patient cervix swelling. IV benadryl ordered. Patient bolusing for epidural.

## 2025-04-24 NOTE — PROGRESS NOTES
Dr. Hernandez updated on patient's pain level and request for IV pain medicine. Patient does not want epidural at this time. Order received for IV stadol and patient is able to get epidural when ready.

## 2025-04-24 NOTE — PROGRESS NOTES
Spiritual Health History and Assessment/Progress Note  [facility]    Initial Encounter, Spiritual/Emotional Needs,  ,  ,      Name: Bradford Rivera MRN: [id number]    Age: 30 y.o.     Sex: female   Language: English   Uatsdin: None   Normal labor     Date: 4/24/2025                           Spiritual Assessment began in [facility] 3X LD        Referral/Consult From: Rounding   Encounter Overview/Reason: Initial Encounter, Spiritual/Emotional Needs  Service Provided For: Patient, Significant other    Genna, Belief, Meaning:   Patient identifies as spiritual, is connected with a genna tradition or spiritual practice, and has beliefs or practices that help with coping during difficult times  Family/Friends No family/friends present      Importance and Influence:  Patient has spiritual/personal beliefs that influence decisions regarding their health  Family/Friends No family/friends present    Community:  Patient feels well-supported. Support system includes: Spouse/Partner  Family/Friends No family/friends present    Assessment and Plan of Care:     Patient Interventions include: Facilitated expression of thoughts and feelings, Explored spiritual coping/struggle/distress, Affirmed coping skills/support systems, and Provided sacramental/Orthodoxy ritual  Family/Friends Interventions include: No family/friends present    Patient Plan of Care: Spiritual Care available upon further referral  Family/Friends Plan of Care: Spiritual Care available upon further referral    Electronically signed by MARICARMEN Dominguez on 4/24/2025 at 11:09 AM

## 2025-04-24 NOTE — PROGRESS NOTES
RN and Dr. Hernandez at bedside. AROM at 1107 for clear fluid. SVE of 5/100/0. Patient requesting IV pain medicine at this time. Patient declines epidural at this time.

## 2025-04-24 NOTE — PROGRESS NOTES
Spoke on the phone with Dr. Hernandez and updated on SVE 8/90/0 with swollen anterior lip, repositioned on right side with peanut ball, benadryl 25 mg IV administered at 1332, ordered to give another one time dose of 25 mg of IV benadryl now.

## 2025-04-24 NOTE — PROGRESS NOTES
Pt  at 39w1d presents to unit with ctx that started at 3am. Pt denies lof, headache,and visual disturbances. Pt states she has had some bloody show. EFM applied. + FM. Call light within reach.

## 2025-04-24 NOTE — PROGRESS NOTES
Dr. Hernandez updated on SVE of 8/100/0. Patient feeling urge to push and body is involuntarily pushing.

## 2025-04-24 NOTE — H&P
Department of Obstetrics and Gynecology  Nurse Practitioner Obstetrics History and Physical        CHIEF COMPLAINT:  Contractions    HISTORY OF PRESENT ILLNESS:    The patient is a 30 y.o. female , Patient's last menstrual period was 2024.,  at 39w1d. Pt presents to l&d with complaints of ctx starting around 0300. She denies lof, vb or decreased fm.   Gbs +    OB History          6    Para   1    Term   1            AB   4    Living   1         SAB   3    IAB        Ectopic        Molar        Multiple   0    Live Births   1                Estimated Due Date: Estimated Date of Delivery: 25    PRENATAL CARE:   gestational dm    Complicated by:   Patient Active Problem List   Diagnosis Code    26 weeks gestation of pregnancy Z3A.26    Pregnancy headache in third trimester O26.893, R51.9    Swelling of lower extremity during pregnancy in third trimester O12.03    Fall (on) (from) other stairs and steps, initial encounter W10.8XXA    Multigravida in third trimester Z34.81    Group B streptococcal bacteriuria-positive culture 3/17/25. R82.71    Adult BMI 36.0-36.9 kg/sq m (pregravid BMI 36.44) Z68.36    Obesity affecting pregnancy in third trimester O99.213    History of gestational hypertension-taking LDA Z87.59    Headache in pregnancy, antepartum, third trimester (gets frequent HA , no ither s/s  PIH- -BP WNL - no meds/tylenol taken) O26.893, R51.9    History of miscarriage - 3 previous (2 in  1 in  G2, G4 and G5) Z87.59    Pregnancy in third trimester with history of  (G1 eAB; G2,G4,G5 sAb) O09.293    Yeast vaginitis B37.31       PAST OB HISTORY  OB History          6    Para   1    Term   1            AB   4    Living   1         SAB   3    IAB        Ectopic        Molar        Multiple   0    Live Births   1                Past Medical History:        Diagnosis Date    Missed       Past Surgical History:        Procedure Laterality Date     negative  Psychosocial: negative    PHYSICAL EXAM:    General appearance:  awake, alert, cooperative, no apparent distress, and appears stated age  Neurologic:  Awake, alert, oriented to name, place and time.  Cranial nerves II-XII are grossly intact.    Lungs:  clear to auscultation bilaterally  Heart:  regular rate and rhythm  Abdomen:   soft, non-distended, non-tender, no masses palpated, gravid  Fetal heart rate:  Baseline Heart Rate 135, accelerations:  present, decels:none  Pelvis:  External Genitalia: no lesions  Cervix:  DILATION:  3 cm  EFFACEMENT:   70%  STATION:  -2  CONSISTENCY:  soft    Contraction frequency:  3-6 minutes  Membranes:  Intact    LMP 2024 Comment: missed                 Prenatal Labs    Group B Strep: positive  ABO/Rh   Date Value Ref Range Status   2025 O POSITIVE  Final     Antibody Screen   Date Value Ref Range Status   2025 NEGATIVE  Final     Rubella Antibody, IgG   Date Value Ref Range Status   2024 Immune Immune IU/mL Final     Comment:     Result: 15       Reference Range:         <5 IU/mL Non Immune   5 to 9 IU/mL Equivocal     >=10 IU/mL Immune         RPR   Date Value Ref Range Status   2024 NONREACTIVE NONREACTIVE Final     Hepatitis B Surface Ag   Date Value Ref Range Status   2024 NONREACTIVE NONREACTIVE Final     Varicella Zoster Ab IgG   Date Value Ref Range Status   2024 Not Immune (A) Immune Final     Comment:     Result: 0.42       Reference Range:           <0.60 Non Immune  0.60 to 0.89 Equivocal         >=0.9 Immune              ASSESSMENT AND PLAN:  D/w Dr. riley  Admit  Routine admission orders  Arom when second dose of penicillin  Penicillin q 4 hours  Epidural upon request        Electronically signed by ROXANA Orourke CNP on 2025 at 6:25 AM

## 2025-04-24 NOTE — PROGRESS NOTES
Dr. Hernandez updated on SVE of 9/90/0. Patient diamante every 4-5 minutes. Order to begin Pitocin IV to achieve an adequate contraction patterns.

## 2025-04-24 NOTE — ANESTHESIA PRE PROCEDURE
Department of Anesthesiology  Preprocedure Note       Name:  Bradford Rivera   Age:  30 y.o.  :  1995                                          MRN:  [id number]         Date:  2025      Surgeon: * No surgeons listed *    Procedure: * No procedures listed *    Medications prior to admission:   Prior to Admission medications    Medication Sig Start Date End Date Taking? Authorizing Provider   terconazole (TERAZOL 7) 0.4 % vaginal cream Place vaginally nightly.  Patient not taking: Reported on 2025  Edd Hernandez MD   terconazole (TERAZOL 7) 0.4 % vaginal cream Place vaginally nightly.  Patient not taking: Reported on 2025   Garth Jaeger APRN - CNM   ondansetron (ZOFRAN) 4 MG tablet Take 1 tablet by mouth 3 times daily as needed for Nausea or Vomiting  Patient not taking: Reported on 2025   Becca Box APRN - CNP   cetirizine (ZYRTEC) 10 MG tablet Take 1 tablet by mouth daily  Patient not taking: Reported on 2025   Garth Jaeger APRN - CNM   sertraline (ZOLOFT) 25 MG tablet Take 1 tablet by mouth daily  Patient not taking: Reported on 24   Becca Box APRN - CNP   docusate sodium (COLACE) 100 MG capsule Take 1 capsule by mouth 2 times daily  Patient not taking: Reported on 2025   Rina Rodriguez APRN - CNP   Prenatal Vit-Fe Fumarate-FA (PRENATAL PLUS) 27-1 MG TABS Take 1 tablet by mouth daily  Patient not taking: Reported on 2025   Rina Rodriguez APRN - CNP       Current medications:    Current Facility-Administered Medications   Medication Dose Route Frequency Provider Last Rate Last Admin    lactated ringers infusion   IntraVENous Continuous Carmela Moreno APRN - CNP        lactated ringers bolus 500 mL  500 mL IntraVENous PRN Carmela Moreno APRN - CNP        Or    lactated ringers bolus 500 mL  500 mL IntraVENous PRN Carmela Moreno APRN - CNP

## 2025-04-25 PROCEDURE — 6370000000 HC RX 637 (ALT 250 FOR IP): Performed by: OBSTETRICS & GYNECOLOGY

## 2025-04-25 PROCEDURE — 1220000000 HC SEMI PRIVATE OB R&B

## 2025-04-25 RX ORDER — BUPROPION HYDROCHLORIDE 150 MG/1
150 TABLET ORAL DAILY
Status: DISCONTINUED | OUTPATIENT
Start: 2025-04-25 | End: 2025-04-26 | Stop reason: HOSPADM

## 2025-04-25 RX ADMIN — PRENATAL WITH FERROUS FUM AND FOLIC ACID 1 TABLET: 3080; 920; 120; 400; 22; 1.84; 3; 20; 10; 1; 12; 200; 27; 25; 2 TABLET ORAL at 09:23

## 2025-04-25 RX ADMIN — Medication: at 00:46

## 2025-04-25 RX ADMIN — ACETAMINOPHEN 1000 MG: 325 TABLET ORAL at 09:23

## 2025-04-25 RX ADMIN — IBUPROFEN 600 MG: 600 TABLET, FILM COATED ORAL at 22:53

## 2025-04-25 RX ADMIN — DOCUSATE SODIUM 100 MG: 100 CAPSULE, LIQUID FILLED ORAL at 09:23

## 2025-04-25 RX ADMIN — BUPROPION HYDROCHLORIDE 150 MG: 150 TABLET, EXTENDED RELEASE ORAL at 09:23

## 2025-04-25 RX ADMIN — DOCUSATE SODIUM 100 MG: 100 CAPSULE, LIQUID FILLED ORAL at 20:35

## 2025-04-25 RX ADMIN — IBUPROFEN 600 MG: 600 TABLET, FILM COATED ORAL at 06:41

## 2025-04-25 ASSESSMENT — PAIN - FUNCTIONAL ASSESSMENT
PAIN_FUNCTIONAL_ASSESSMENT: ACTIVITIES ARE NOT PREVENTED
PAIN_FUNCTIONAL_ASSESSMENT: ACTIVITIES ARE NOT PREVENTED

## 2025-04-25 ASSESSMENT — PAIN DESCRIPTION - DESCRIPTORS
DESCRIPTORS: CRAMPING
DESCRIPTORS: CRAMPING
DESCRIPTORS: CRAMPING;DISCOMFORT

## 2025-04-25 ASSESSMENT — PAIN DESCRIPTION - LOCATION
LOCATION: ABDOMEN

## 2025-04-25 ASSESSMENT — PAIN SCALES - GENERAL
PAINLEVEL_OUTOF10: 4
PAINLEVEL_OUTOF10: 5
PAINLEVEL_OUTOF10: 4

## 2025-04-25 ASSESSMENT — PAIN DESCRIPTION - ORIENTATION
ORIENTATION: ANTERIOR;LOWER
ORIENTATION: LOWER;MID

## 2025-04-25 NOTE — PROGRESS NOTES
[facility] screening results were discussed with parent. Questions answered. Brochure given to parent. Advised to monitor developmental milestones and contact physician for any concerns.   Danica De La Cruz

## 2025-04-25 NOTE — ANESTHESIA POSTPROCEDURE EVALUATION
Department of Anesthesiology  Postprocedure Note    Patient: Bradford Rivera  MRN: [id number]  YOB: 1995  Date of evaluation: 4/25/2025    Procedure Summary       Date: 04/24/25 Room / Location:     Anesthesia Start: 1400 Anesthesia Stop: 2214    Procedure: Labor Analgesia Diagnosis:     Scheduled Providers:  Responsible Provider: Bridgett Mcleod MD    Anesthesia Type: epidural ASA Status: 2            Anesthesia Type: No value filed.    Vikki Phase I:      Vikki Phase II:      Anesthesia Post Evaluation    Patient location during evaluation: bedside  Patient participation: complete - patient participated  Level of consciousness: awake and alert  Pain score: 0  Airway patency: patent  Nausea & Vomiting: no nausea and no vomiting  Cardiovascular status: hemodynamically stable  Respiratory status: acceptable  Hydration status: euvolemic  Comments: Patient satisfied with epidural for labor  Pain management: adequate        No notable events documented.

## 2025-04-25 NOTE — PROGRESS NOTES
RN remained at bedside throughout pushing.  EFM continuously assessed.  Vaginal delivery of viable infant male at 2214. Apgars 8/9

## 2025-04-25 NOTE — PROGRESS NOTES
Patient admitted to Mom/Baby Unit. Admission papers reviewed. Patient's rights and responsibilities reviewed and verbalized understanding. Assessment as charted. Bleeding small amount, no clots. IV running LR and Pit per order. Mother gave the OK for baby to receive HEP B Vaccine. Patient had TDAP during pregnancy. Instructed call light and the phone number to call for the RN. Pain level 0/10. Patient satisfied. White board updated, call bell within reach and bed low. Infant safety and falls reviewed with patient, patient verbalized understanding.

## 2025-04-25 NOTE — PROGRESS NOTES
Dr. Hernandez updated sve at 1915 6-7/80/0 and anterior lip very swollen. Patient positioned in knee chest trendelenburg and was able to tolerate it for 10 minutes. Patient then positioned to right side exaggerated runners for 10 minutes. SVE at 2000 6-7/80/0 anterior lip still very swollen. Occasional variables on FHT. IUPC fell out when moving out of knee chest. New order for 25mg IV benadryl and to continuously move patient

## 2025-04-25 NOTE — L&D DELIVERY NOTE
Department of Obstetrics and Gynecology  Spontaneous Vaginal Delivery Note    Patient:  Bradford Rivera     Admit Date:  2025  5:33 AM  Medical Record Number:  [id number]   Procedure Date: 2025 10:58 PM     Pre-delivery Diagnosis: Multigravida IUP at 39 weeks 1 day, spontaneous onset labor, GBS bacteriuria, pregnancy headache, obesity (pregravid BMI 36.44), history of gestational hypertension (on LDA), maternal varicella nonimmune, history of  x 4 (G1 eAb; G2,G4,G5 sAb)  Patient Active Problem List   Diagnosis    Pregnancy headache in third trimester    Multigravida in third trimester    Group B streptococcal bacteriuria-positive culture 3/17/25.    Adult BMI 36.0-36.9 kg/sq m (pregravid BMI 36.44)    Obesity affecting pregnancy in third trimester    History of gestational hypertension-taking LDA    Headache in pregnancy, antepartum, third trimester (gets frequent HA , no ither s/s  PIH- -BP WNL - no meds/tylenol taken)    History of miscarriage - 3 previous (2 in  1 in  G2, G4 and G5)    Pregnancy in third trimester with history of  (G1 eAB; G2,G4,G5 sAb)    Normal labor    Maternal varicella, non-immune    39 1/7 weeks gestation of pregnancy     Post-delivery Diagnosis:  Living  infant Male  Patient Active Problem List   Diagnosis    Pregnancy headache in third trimester    Multigravida in third trimester    Group B streptococcal bacteriuria-positive culture 3/17/25.    Adult BMI 36.0-36.9 kg/sq m (pregravid BMI 36.44)    Obesity affecting pregnancy in third trimester    History of gestational hypertension-taking LDA    Headache in pregnancy, antepartum, third trimester (gets frequent HA , no ither s/s  PIH- -BP WNL - no meds/tylenol taken)    History of miscarriage - 3 previous (2 in  1 in  G2, G4 and G5)    Pregnancy in third trimester with history of  (G1 eAB; G2,G4,G5 sAb)     (normal spontaneous vaginal delivery)    Maternal varicella, non-immune

## 2025-04-25 NOTE — PROGRESS NOTES
PPD #1    Patient:  Bardford Rivera     Admit Date:  4/24/2025  5:33 AM  Medical Record Number:  [id number]   Today's Date: 4/25/2025    S: No complaints, her pregnancy headache has resolved, she is doing well, would like he did not want discharge however it would be after 10:30 PM; tolerating diet: yes -General; ambulating well: yes -up in room, does not want to go out to the hallways (germaphobe); voiding without difficulty:  yes -has no urinary complaints; bm: denies; flatus: Normal; pain meds appropriate: yes -Tylenol and ibuprofen 600 mg; vaginal bleeding: Like a period.    O:   Vitals:    04/24/25 2340 04/25/25 0031 04/25/25 0342 04/25/25 0732   BP: (!) 142/67 124/73 115/73 121/81   Pulse: (!) 105 92 98 85   Resp:  16 16 16   Temp:  97.5 °F (36.4 °C) 97.7 °F (36.5 °C) 97.9 °F (36.6 °C)   TempSrc:  Oral Oral Oral   SpO2:  98% 99% 99%     Gen: A&O, cooperative, pleasant   Heart: RRR   Lungs: CTA b/l   Abd; soft, NT, non distended, +BS  Back: no CVA or paraspinal tenderness   Ext: No clubbing, cyanosis, edema:no , no cords palpable, no calf tenderness   Neuro: intact   Uterus: firm, well contracted, nt   Perineum: intact, healing well   Radha pad: staining only, thin lochia    Lab Results   Component Value Date    HGB 12.1 04/24/2025    HCT 34.8 04/24/2025      Recent Results (from the past 72 hours)   Fetal nonstress test    Collection Time: 04/23/25 12:00 AM   Result Value Ref Range    NST Locations      NST Indications      NST Duration      NST Baseline      FHR Variabilities      Accelerations > 10bpm      Accelerations > 15 BPM      Decelerations      Uterine Activity      Acoustic Stimulation      Assessment      NST Return      NST Read by     CBC with Auto Differential    Collection Time: 04/24/25  5:45 AM   Result Value Ref Range    WBC 11.7 (H) 4.5 - 11.5 k/uL    RBC 3.80 3.50 - 5.50 m/uL    Hemoglobin 12.1 11.5 - 15.5 g/dL    Hematocrit 34.8 34.0 - 48.0 %    MCV 91.6 80.0 - 99.9 fL    MCH 31.8 26.0 -

## 2025-04-25 NOTE — PLAN OF CARE
Problem: Vaginal Birth or  Section  Goal: Fetal and maternal status remain reassuring during the birth process  Description:  Birth OB-Pregnancy care plan goal which identifies if the fetal and maternal status remain reassuring during the birth process  Outcome: Progressing     Problem: Pain  Goal: Verbalizes/displays adequate comfort level or baseline comfort level  Outcome: Progressing     Problem: Discharge Planning  Goal: Discharge to home or other facility with appropriate resources  Outcome: Progressing

## 2025-04-25 NOTE — PLAN OF CARE
Problem: Postpartum  Goal: Experiences normal postpartum course  Description:  Postpartum OB-Pregnancy care plan goal which identifies if the mother is experiencing a normal postpartum course  2025 1352 by Anika Dennis RN  Outcome: Progressing  2025 by Micaela Stock RN  Outcome: Progressing  Goal: Appropriate maternal -  bonding  Description:  Postpartum OB-Pregnancy care plan goal which identifies if the mother and  are bonding appropriately  2025 1352 by Anika Dennis RN  Outcome: Progressing  2025 by Micaela Stock RN  Outcome: Progressing  Goal: Establishment of infant feeding pattern  Description:  Postpartum OB-Pregnancy care plan goal which identifies if the mother is establishing a feeding pattern with their   2025 1352 by Anika Dennis RN  Outcome: Progressing  2025 by Micaela Stock RN  Outcome: Progressing  Goal: Incisions, wounds, or drain sites healing without S/S of infection  2025 1352 by Anika Dennis RN  Outcome: Progressing  2025 by Micaela Stock RN  Outcome: Progressing

## 2025-04-25 NOTE — LACTATION NOTE
Pt plans to formula feed-does not wish to breastfeed or pump.  Knows how to manage milk supply postpartum.

## 2025-04-26 VITALS
HEART RATE: 74 BPM | RESPIRATION RATE: 16 BRPM | OXYGEN SATURATION: 98 % | TEMPERATURE: 97.9 F | DIASTOLIC BLOOD PRESSURE: 75 MMHG | SYSTOLIC BLOOD PRESSURE: 120 MMHG

## 2025-04-26 PROBLEM — Z34.81 MULTIGRAVIDA IN FIRST TRIMESTER: Status: RESOLVED | Noted: 2025-04-10 | Resolved: 2025-04-26

## 2025-04-26 PROBLEM — O99.213 OBESITY AFFECTING PREGNANCY IN THIRD TRIMESTER: Status: RESOLVED | Noted: 2025-04-10 | Resolved: 2025-04-26

## 2025-04-26 PROBLEM — R51.9 PREGNANCY HEADACHE IN THIRD TRIMESTER: Status: RESOLVED | Noted: 2025-01-27 | Resolved: 2025-04-26

## 2025-04-26 PROBLEM — O26.893 PREGNANCY HEADACHE IN THIRD TRIMESTER: Status: RESOLVED | Noted: 2025-01-27 | Resolved: 2025-04-26

## 2025-04-26 PROBLEM — O26.893 HEADACHE IN PREGNANCY, ANTEPARTUM, THIRD TRIMESTER: Status: RESOLVED | Noted: 2025-04-10 | Resolved: 2025-04-26

## 2025-04-26 PROBLEM — R51.9 HEADACHE IN PREGNANCY, ANTEPARTUM, THIRD TRIMESTER: Status: RESOLVED | Noted: 2025-04-10 | Resolved: 2025-04-26

## 2025-04-26 PROCEDURE — 6370000000 HC RX 637 (ALT 250 FOR IP): Performed by: OBSTETRICS & GYNECOLOGY

## 2025-04-26 PROCEDURE — APPNB30 APP NON BILLABLE TIME 0-30 MINS: Performed by: ADVANCED PRACTICE MIDWIFE

## 2025-04-26 RX ORDER — BUPROPION HYDROCHLORIDE 150 MG/1
150 TABLET ORAL DAILY
Qty: 30 TABLET | Refills: 3 | Status: SHIPPED | OUTPATIENT
Start: 2025-04-26

## 2025-04-26 RX ORDER — IBUPROFEN 600 MG/1
600 TABLET, FILM COATED ORAL EVERY 6 HOURS PRN
Qty: 120 TABLET | Refills: 3 | Status: SHIPPED | OUTPATIENT
Start: 2025-04-26 | End: 2025-04-29

## 2025-04-26 RX ORDER — PSEUDOEPHEDRINE HCL 30 MG
100 TABLET ORAL 2 TIMES DAILY
Qty: 60 CAPSULE | Refills: 0 | Status: SHIPPED | OUTPATIENT
Start: 2025-04-26

## 2025-04-26 RX ADMIN — BUPROPION HYDROCHLORIDE 150 MG: 150 TABLET, EXTENDED RELEASE ORAL at 08:24

## 2025-04-26 RX ADMIN — PRENATAL WITH FERROUS FUM AND FOLIC ACID 1 TABLET: 3080; 920; 120; 400; 22; 1.84; 3; 20; 10; 1; 12; 200; 27; 25; 2 TABLET ORAL at 08:23

## 2025-04-26 RX ADMIN — DOCUSATE SODIUM 100 MG: 100 CAPSULE, LIQUID FILLED ORAL at 08:23

## 2025-04-26 RX ADMIN — ACETAMINOPHEN 1000 MG: 325 TABLET ORAL at 02:33

## 2025-04-26 RX ADMIN — IBUPROFEN 600 MG: 600 TABLET, FILM COATED ORAL at 08:24

## 2025-04-26 ASSESSMENT — PAIN SCALES - GENERAL
PAINLEVEL_OUTOF10: 3
PAINLEVEL_OUTOF10: 5

## 2025-04-26 ASSESSMENT — PAIN - FUNCTIONAL ASSESSMENT: PAIN_FUNCTIONAL_ASSESSMENT: ACTIVITIES ARE NOT PREVENTED

## 2025-04-26 ASSESSMENT — PAIN DESCRIPTION - DESCRIPTORS
DESCRIPTORS: CRAMPING
DESCRIPTORS: CRAMPING

## 2025-04-26 ASSESSMENT — PAIN DESCRIPTION - LOCATION
LOCATION: ABDOMEN
LOCATION: ABDOMEN

## 2025-04-26 ASSESSMENT — PAIN DESCRIPTION - ORIENTATION: ORIENTATION: LOWER;MID

## 2025-04-26 NOTE — DISCHARGE INSTRUCTIONS
Follow-up with your OB doctor in 1 week if  delivery or in 6 weeks for vaginal delivery unless otherwise instructed.   Call office for an appointment.    For breastfeeding support, you can contact our lactation specialists at [phone] or [phone]    DIET  Eat a well balanced diet focusing on foods high in fiber and protein  Drink plenty of fluids especially water.  To avoid constipation you may take a mild stool softener as recommended by your doctor or midwife.    ACTIVITY  Gradually increase your activity.  Resume exercise regimen only after advised by your doctor or midwife.  Avoid lifting anything heavier than your baby or a gallon of milk for SIX weeks.   Avoid driving until your doctor or midwife has given their approval.  Rise slowly from a lying to sitting and then a standing position.  Climb stairs one at a time.  Use caution when carrying your baby up and down the stairs.  No sexual activity for 6 weeks or until advised by your doctor - Nothing in vagina: intercourse, tampons, or douching.   Be prepared to discuss family planning at your follow-up OB visit.   You may feel tired or have a lack of energy.  You may continue your prenatal vitamin to replenish nutrients post delivery.  Nap when baby naps to catch up on sleep.  May return to work or school in 6 weeks or as directed by OB.     EMOTIONS  You may feed guerra, sad, teary, & overwhelmed.  Contact your OB provider if you feel you may be showing signs of postpartum depression, or have thoughts of harming yourself or your infant.  If infant will not stop crying, contact another adult for help or place infant in their crib on their back and take a break.  NEVER shake your infant.      BLEEDING  Vaginal bleeding will decrease in amount over the next few weeks.  You will notice that as your activity increases, your flow may increase.  This is your body's way of telling you, you need to take things easier and rest more often.  Call your

## 2025-04-26 NOTE — DISCHARGE SUMMARY
Department of Obstetrics and Gynecology  Labor and Delivery  Discharge Summary    Patient:  Bradford Rivera         Medical Record Number:  [id number]    Admit Date:  2025  5:33 AM    Discharge Date: 2025    Final Diagnosis:   Principal Problem:     (normal spontaneous vaginal delivery)  Active Problems:    Term birth of  male    Adult BMI 36.0-36.9 kg/sq m (pregravid BMI 36.44)    History of gestational hypertension-taking LDA    History of miscarriage - 3 previous (2 in  1 in  G2, G4 and G5)    Maternal varicella, non-immune  Resolved Problems:    39 1/7 weeks gestation of pregnancy    Multigravida in third trimester    Group B streptococcal bacteriuria-positive culture 3/17/25.    Pregnancy headache in third trimester    Obesity affecting pregnancy in third trimester    Headache in pregnancy, antepartum, third trimester (gets frequent HA , no ither s/s  PIH- -BP WNL - no meds/tylenol taken)      Chief Complaint - Presenting Illness - Physical Findings:   Normal labor [O80, Z37.9]  HPI: 30 y.o. W female  at 39w1d with a history of GBS bacteriuria, a pregnancy headache in the third trimester, obesity (pregravid BMI 36.44), a history of gestational hypertension (on LDA), maternal varicella nonimmune status and a history of  x 4 (G1 eAb; G2,G4,G5 sAb) who was admitted for spontaneous onset of labor, with contractions starting at 0300 that morning.  On presentation she was, diamante every 3 to 6 minutes, with a 3/70%/-2 cervix and was admitted for for penicillin G prophylaxis and active management of labor.    Hospital Course:   Delivery Summary:  Procedure Date: 2025 10:58 PM      Pre-delivery Diagnosis: Multigravida IUP at 39 weeks 1 day, spontaneous onset labor, GBS bacteriuria, pregnancy headache, obesity (pregravid BMI 36.44), history of gestational hypertension (on LDA), maternal varicella nonimmune, history of  x 4 (G1 eAb; G2,G4,G5 sAb)  Problem List     weeks.  Showers are fine - avoid bath tubs, hot tubs, swimming.   Instructions for Diet: Regular diet  Discharge Medications:  Current Discharge Medication List        START taking these medications    Details   ibuprofen (ADVIL;MOTRIN) 600 MG tablet Take 1 tablet by mouth every 6 hours as needed for Pain  Qty: 120 tablet, Refills: 3      buPROPion (WELLBUTRIN XL) 150 MG extended release tablet Take 1 tablet by mouth daily  Qty: 30 tablet, Refills: 3           CONTINUE these medications which have CHANGED    Details   docusate sodium (COLACE, DULCOLAX) 100 MG CAPS Take 100 mg by mouth 2 times daily  Qty: 60 capsule, Refills: 0           CONTINUE these medications which have NOT CHANGED    Details   Prenatal Vit-Fe Fumarate-FA (PRENATAL PLUS) 27-1 MG TABS Take 1 tablet by mouth daily  Qty: 30 tablet, Refills: 11           STOP taking these medications       terconazole (TERAZOL 7) 0.4 % vaginal cream Comments:   Reason for Stopping:         terconazole (TERAZOL 7) 0.4 % vaginal cream Comments:   Reason for Stopping:         ondansetron (ZOFRAN) 4 MG tablet Comments:   Reason for Stopping:         cetirizine (ZYRTEC) 10 MG tablet Comments:   Reason for Stopping:         sertraline (ZOLOFT) 25 MG tablet Comments:   Reason for Stopping:             Follow-Up Visit Plan: In 6 weeks for final postpartum visit.  Disposition: Home.    Time Spent on Discharge:  30 minutes were spent in patient examination, evaluation, counseling as well as medication reconciliation, prescriptions for required medications, discharge plan and follow up.      ROXANA Stanley CNM   4/26/2025 8:19 AM

## 2025-04-26 NOTE — DISCHARGE INSTR - ACTIVITY
avoid/relieve constipation take stool softeners if advised   Increase fiber in your diet    Most importantly ENJOY your Baby!  - Dr. OGDEN =)))    Please call immediately with Questions and Concerns - [phone] (Office) or [phone] (Answering Service) after hours and weekends.     By signing below, I understand that if any emergent problems occur, once I leave the hospital, I am to dial #[phone] or go immediately to the nearest Emergency Room.  For less emergent matters,  Dr. Hernandez can be reached by calling his Office or  answering service at the above numbers.  I understand and acknowledge receipt of the instructions indicated above.

## 2025-04-26 NOTE — PROGRESS NOTES
PPD #2     Patient:  Bradford Rivera     Admit Date:  4/24/2025  5:33 AM  Medical Record Number:  [id number]   Today's Date: 4/26/2025    S: No complaints; tolerating diet: affirms ; ambulating well: affirms; voiding without difficulty:  affirms; bm: affirms; flatus: affirms; pain meds appropriate: affirms; vaginal bleeding: thin lochia.    O:   Vitals:    04/25/25 0342 04/25/25 0732 04/25/25 1549 04/26/25 0021   BP: 115/73 121/81 117/71 127/72   Pulse: 98 85 83 89   Resp: 16 16 16 16   Temp: 97.7 °F (36.5 °C) 97.9 °F (36.6 °C) 98.2 °F (36.8 °C) 98 °F (36.7 °C)   TempSrc: Oral Oral Oral Oral   SpO2: 99% 99% 98% 97%     Gen: A&O, cooperative, pleasant   Heart: RRR   Lungs: CTA b/l   Abd; soft, NT, non distended, +BS  Back: no CVA or paraspinal tenderness   Ext: No clubbing, cyanosis, edema:1+ , no cords palpable, no calf tenderness   Neuro: intact   Uterus: firm, well contracted, nt   Perineum: intact, healing well   Radha pad: thin lochia    Lab Results   Component Value Date    HGB 12.1 04/24/2025    HCT 34.8 04/24/2025      Recent Results (from the past 72 hours)   CBC with Auto Differential    Collection Time: 04/24/25  5:45 AM   Result Value Ref Range    WBC 11.7 (H) 4.5 - 11.5 k/uL    RBC 3.80 3.50 - 5.50 m/uL    Hemoglobin 12.1 11.5 - 15.5 g/dL    Hematocrit 34.8 34.0 - 48.0 %    MCV 91.6 80.0 - 99.9 fL    MCH 31.8 26.0 - 35.0 pg    MCHC 34.8 (H) 32.0 - 34.5 g/dL    RDW 12.6 11.5 - 15.0 %    Platelets 156 130 - 450 k/uL    MPV 11.8 7.0 - 12.0 fL    Neutrophils % 76 43.0 - 80.0 %    Lymphocytes % 18 (L) 20.0 - 42.0 %    Monocytes % 5 2.0 - 12.0 %    Eosinophils % 0 0 - 6 %    Basophils % 0 0.0 - 2.0 %    Immature Granulocytes % 1 0.0 - 5.0 %    Neutrophils Absolute 8.85 (H) 1.80 - 7.30 k/uL    Lymphocytes Absolute 2.07 1.50 - 4.00 k/uL    Monocytes Absolute 0.60 0.10 - 0.95 k/uL    Eosinophils Absolute 0.05 0.05 - 0.50 k/uL    Basophils Absolute 0.02 0.00 - 0.20 k/uL    Immature Granulocytes Absolute 0.12 0.00 -  0.58 k/uL   TYPE AND SCREEN    Collection Time: 25  5:45 AM   Result Value Ref Range    Blood Bank Sample Expiration 2025,2359     Arm Band Number [id number]     ABO/Rh O POSITIVE     Antibody Screen NEGATIVE    URINE DRUG SCREEN    Collection Time: 25  5:45 AM   Result Value Ref Range    Amphetamine Screen, Ur NEGATIVE NEGATIVE    Barbiturate Screen, Ur NEGATIVE NEGATIVE    Benzodiazepine Screen, Urine NEGATIVE NEGATIVE    Cocaine Metabolite, Urine NEGATIVE NEGATIVE    Methadone Screen, Urine NEGATIVE NEGATIVE    Opiates, Urine NEGATIVE NEGATIVE    Phencyclidine, Urine NEGATIVE NEGATIVE    Cannabinoid Scrn, Ur NEGATIVE NEGATIVE    Oxycodone Screen, Ur NEGATIVE NEGATIVE    Fentanyl, Ur NEGATIVE NEGATIVE    Buprenorphine Urine NEGATIVE NEGATIVE    Test Information       These drug screen results are for medical purposes only and should not be considered definitive or confirmed.       A: 30 y.o. female  at 39w1d weeks  PPD #2 S/P ; Episiotomy was not needed  Patient Active Problem List   Diagnosis    Pregnancy headache in third trimester    Multigravida in third trimester    Group B streptococcal bacteriuria-positive culture 3/17/25.    Adult BMI 36.0-36.9 kg/sq m (pregravid BMI 36.44)    Obesity affecting pregnancy in third trimester    History of gestational hypertension-taking LDA    Headache in pregnancy, antepartum, third trimester (gets frequent HA , no ither s/s  PIH- -BP WNL - no meds/tylenol taken)    History of miscarriage - 3 previous (2 in  1 in  G2, G4 and G5)    Pregnancy in third trimester with history of  (G1 eAB; G2,G4,G5 sAb)     (normal spontaneous vaginal delivery)    Maternal varicella, non-immune    39 1/7 weeks gestation of pregnancy    Term birth of  male       P: Routine PP care.   Discharge home with instructions, precautions.  Prescription for Ibuprofen 600 mg.Wellbutrin  May start diet pills 4-6 weeks pp and birth control will be

## 2025-04-27 PROBLEM — R82.71 GROUP B STREPTOCOCCAL BACTERIURIA: Status: RESOLVED | Noted: 2025-04-10 | Resolved: 2025-04-27

## 2025-04-27 PROBLEM — O09.293 PREGNANCY IN THIRD TRIMESTER WITH HISTORY OF ABORTION: Status: RESOLVED | Noted: 2025-04-10 | Resolved: 2025-04-27

## 2025-04-27 PROBLEM — Z3A.39 39 WEEKS GESTATION OF PREGNANCY: Status: RESOLVED | Noted: 2025-04-24 | Resolved: 2025-04-27

## (undated) DEVICE — MARKER,SKIN,WI/RULER AND LABELS: Brand: MEDLINE

## (undated) DEVICE — DOUBLE BASIN SET: Brand: MEDLINE INDUSTRIES, INC.

## (undated) DEVICE — PAD,NON-ADHERENT,2X3,STERILE,LF,1/PK: Brand: MEDLINE

## (undated) DEVICE — GAUZE,SPONGE,4"X4",16PLY,XRAY,STRL,LF: Brand: MEDLINE

## (undated) DEVICE — TRAP TISS DISP FOR COLL SYS BERK SAFETOUCH

## (undated) DEVICE — COVER HNDL LT DISP

## (undated) DEVICE — SET FLD COLL CLR W/ BLT IN WARN SYS FOR HYSTSCP DOLPHIN

## (undated) DEVICE — LEGGINGS, PAIR, 31X48, STERILE: Brand: MEDLINE

## (undated) DEVICE — SYSTEM COLL W/ TISS TRAP INCLUDE COLL CANSTR LID SET OF

## (undated) DEVICE — TOWEL,OR,DSP,ST,BLUE,STD,6/PK,12PK/CS: Brand: MEDLINE

## (undated) DEVICE — JELLY,LUBE,STERILE,FLIP TOP,TUBE,2-OZ: Brand: MEDLINE

## (undated) DEVICE — GOWN,SIRUS,POLYRNF,BRTHSLV,XLN/XL,20/CS: Brand: MEDLINE

## (undated) DEVICE — CURETTE VAC DIA9MM PLAS CRV OPN TIP RIG DISP VACURET D/E

## (undated) DEVICE — PAD,SANITARY,11 IN,MAXI,N-STRL,IND WRAP: Brand: MEDLINE

## (undated) DEVICE — DRAPE,REIN 53X77,STERILE: Brand: MEDLINE

## (undated) DEVICE — GLOVE ORANGE PI 8   MSG9080

## (undated) DEVICE — GOWN,SIRUS,FABRNF,XL,20/CS: Brand: MEDLINE

## (undated) DEVICE — VAGINAL PREP TRAY: Brand: MEDLINE INDUSTRIES, INC.

## (undated) DEVICE — HOSE CONN L18IN UTER DISP FOR BERK SAFETOUCH SYS

## (undated) DEVICE — ELECTRODE PT RET AD L9FT HI MOIST COND ADH HYDRGEL CORDED

## (undated) DEVICE — PENCIL ES L3M BTTN SWCH HOLSTER W/ BLDE ELECTRD EDGE

## (undated) DEVICE — COVER,LIGHT HANDLE,FLX,2/PK: Brand: MEDLINE INDUSTRIES, INC.

## (undated) DEVICE — Device